# Patient Record
Sex: MALE | Race: WHITE | NOT HISPANIC OR LATINO
[De-identification: names, ages, dates, MRNs, and addresses within clinical notes are randomized per-mention and may not be internally consistent; named-entity substitution may affect disease eponyms.]

---

## 2017-01-09 ENCOUNTER — APPOINTMENT (OUTPATIENT)
Dept: OTOLARYNGOLOGY | Facility: CLINIC | Age: 37
End: 2017-01-09

## 2017-01-22 ENCOUNTER — RESULT REVIEW (OUTPATIENT)
Age: 37
End: 2017-01-22

## 2017-01-23 ENCOUNTER — OUTPATIENT (OUTPATIENT)
Dept: OUTPATIENT SERVICES | Facility: HOSPITAL | Age: 37
LOS: 1 days | End: 2017-01-23
Payer: COMMERCIAL

## 2017-01-23 DIAGNOSIS — C32.9 MALIGNANT NEOPLASM OF LARYNX, UNSPECIFIED: ICD-10-CM

## 2017-01-24 LAB — SURGICAL PATHOLOGY STUDY: SIGNIFICANT CHANGE UP

## 2017-01-25 NOTE — ASU PATIENT PROFILE, ADULT - PSH
Elective surgery  throat surgery biopsy  Elective surgery  appendectomy  Elective surgery  bilateral ear tubes as a child

## 2017-01-26 ENCOUNTER — OUTPATIENT (OUTPATIENT)
Dept: OUTPATIENT SERVICES | Facility: HOSPITAL | Age: 37
LOS: 1 days | Discharge: HOME | End: 2017-01-26
Payer: COMMERCIAL

## 2017-01-26 ENCOUNTER — APPOINTMENT (OUTPATIENT)
Dept: OTOLARYNGOLOGY | Facility: HOSPITAL | Age: 37
End: 2017-01-26

## 2017-01-26 VITALS
OXYGEN SATURATION: 97 % | DIASTOLIC BLOOD PRESSURE: 57 MMHG | SYSTOLIC BLOOD PRESSURE: 123 MMHG | HEIGHT: 65 IN | TEMPERATURE: 99 F | RESPIRATION RATE: 20 BRPM | WEIGHT: 173.72 LBS | HEART RATE: 69 BPM

## 2017-01-26 VITALS
DIASTOLIC BLOOD PRESSURE: 61 MMHG | HEART RATE: 67 BPM | SYSTOLIC BLOOD PRESSURE: 109 MMHG | OXYGEN SATURATION: 98 % | TEMPERATURE: 98 F | RESPIRATION RATE: 16 BRPM

## 2017-01-26 DIAGNOSIS — Z41.9 ENCOUNTER FOR PROCEDURE FOR PURPOSES OTHER THAN REMEDYING HEALTH STATE, UNSPECIFIED: Chronic | ICD-10-CM

## 2017-01-26 PROCEDURE — 31526 DX LARYNGOSCOPY W/OPER SCOPE: CPT

## 2017-01-26 RX ORDER — ACETAMINOPHEN 500 MG
650 TABLET ORAL EVERY 6 HOURS
Qty: 0 | Refills: 0 | Status: DISCONTINUED | OUTPATIENT
Start: 2017-01-26 | End: 2017-01-26

## 2017-01-26 RX ORDER — ONDANSETRON 8 MG/1
4 TABLET, FILM COATED ORAL EVERY 6 HOURS
Qty: 0 | Refills: 0 | Status: DISCONTINUED | OUTPATIENT
Start: 2017-01-26 | End: 2017-01-26

## 2017-01-26 RX ORDER — ACETAMINOPHEN WITH CODEINE 300MG-30MG
1 TABLET ORAL EVERY 4 HOURS
Qty: 0 | Refills: 0 | Status: DISCONTINUED | OUTPATIENT
Start: 2017-01-26 | End: 2017-01-26

## 2017-01-30 ENCOUNTER — APPOINTMENT (OUTPATIENT)
Dept: OTOLARYNGOLOGY | Facility: CLINIC | Age: 37
End: 2017-01-30

## 2017-02-01 ENCOUNTER — APPOINTMENT (OUTPATIENT)
Dept: OTOLARYNGOLOGY | Facility: CLINIC | Age: 37
End: 2017-02-01

## 2017-02-01 VITALS
HEIGHT: 65 IN | TEMPERATURE: 98.4 F | HEART RATE: 99 BPM | SYSTOLIC BLOOD PRESSURE: 117 MMHG | WEIGHT: 170 LBS | DIASTOLIC BLOOD PRESSURE: 89 MMHG | BODY MASS INDEX: 28.32 KG/M2

## 2017-02-01 DIAGNOSIS — R49.0 DYSPHONIA: ICD-10-CM

## 2017-02-01 DIAGNOSIS — K21.9 GASTRO-ESOPHAGEAL REFLUX DISEASE WITHOUT ESOPHAGITIS: ICD-10-CM

## 2017-02-01 DIAGNOSIS — C32.0 MALIGNANT NEOPLASM OF GLOTTIS: ICD-10-CM

## 2017-02-02 ENCOUNTER — TRANSCRIPTION ENCOUNTER (OUTPATIENT)
Age: 37
End: 2017-02-02

## 2017-03-01 ENCOUNTER — APPOINTMENT (OUTPATIENT)
Dept: OTOLARYNGOLOGY | Facility: CLINIC | Age: 37
End: 2017-03-01

## 2017-03-01 VITALS
HEIGHT: 65 IN | WEIGHT: 170 LBS | SYSTOLIC BLOOD PRESSURE: 125 MMHG | DIASTOLIC BLOOD PRESSURE: 76 MMHG | TEMPERATURE: 98.4 F | HEART RATE: 77 BPM | BODY MASS INDEX: 28.32 KG/M2

## 2017-03-07 ENCOUNTER — APPOINTMENT (OUTPATIENT)
Dept: OTOLARYNGOLOGY | Facility: CLINIC | Age: 37
End: 2017-03-07

## 2017-04-05 ENCOUNTER — APPOINTMENT (OUTPATIENT)
Dept: OTOLARYNGOLOGY | Facility: CLINIC | Age: 37
End: 2017-04-05

## 2017-04-05 VITALS — HEIGHT: 65 IN | WEIGHT: 170 LBS | BODY MASS INDEX: 28.32 KG/M2

## 2017-05-03 ENCOUNTER — APPOINTMENT (OUTPATIENT)
Dept: OTOLARYNGOLOGY | Facility: CLINIC | Age: 37
End: 2017-05-03

## 2017-05-15 ENCOUNTER — APPOINTMENT (OUTPATIENT)
Dept: OTOLARYNGOLOGY | Facility: CLINIC | Age: 37
End: 2017-05-15

## 2017-05-15 VITALS
SYSTOLIC BLOOD PRESSURE: 129 MMHG | TEMPERATURE: 98.8 F | HEART RATE: 75 BPM | WEIGHT: 170 LBS | BODY MASS INDEX: 28.32 KG/M2 | HEIGHT: 65 IN | DIASTOLIC BLOOD PRESSURE: 76 MMHG

## 2017-06-14 ENCOUNTER — APPOINTMENT (OUTPATIENT)
Dept: OTOLARYNGOLOGY | Facility: CLINIC | Age: 37
End: 2017-06-14

## 2017-06-14 VITALS
BODY MASS INDEX: 28.49 KG/M2 | SYSTOLIC BLOOD PRESSURE: 125 MMHG | WEIGHT: 171 LBS | DIASTOLIC BLOOD PRESSURE: 69 MMHG | HEIGHT: 65 IN | TEMPERATURE: 98.7 F | HEART RATE: 62 BPM

## 2017-07-26 ENCOUNTER — APPOINTMENT (OUTPATIENT)
Dept: OTOLARYNGOLOGY | Facility: CLINIC | Age: 37
End: 2017-07-26

## 2017-07-26 VITALS
TEMPERATURE: 98.3 F | WEIGHT: 177 LBS | BODY MASS INDEX: 29.49 KG/M2 | OXYGEN SATURATION: 98 % | SYSTOLIC BLOOD PRESSURE: 122 MMHG | HEART RATE: 72 BPM | DIASTOLIC BLOOD PRESSURE: 79 MMHG | HEIGHT: 65 IN

## 2017-08-23 ENCOUNTER — APPOINTMENT (OUTPATIENT)
Dept: OTOLARYNGOLOGY | Facility: CLINIC | Age: 37
End: 2017-08-23

## 2017-08-28 ENCOUNTER — APPOINTMENT (OUTPATIENT)
Dept: OTOLARYNGOLOGY | Facility: CLINIC | Age: 37
End: 2017-08-28
Payer: COMMERCIAL

## 2017-08-28 PROCEDURE — 99214 OFFICE O/P EST MOD 30 MIN: CPT | Mod: 25

## 2017-08-28 PROCEDURE — 31575 DIAGNOSTIC LARYNGOSCOPY: CPT

## 2017-09-20 ENCOUNTER — APPOINTMENT (OUTPATIENT)
Dept: OTOLARYNGOLOGY | Facility: CLINIC | Age: 37
End: 2017-09-20
Payer: COMMERCIAL

## 2017-09-20 VITALS
HEIGHT: 65 IN | DIASTOLIC BLOOD PRESSURE: 86 MMHG | SYSTOLIC BLOOD PRESSURE: 126 MMHG | WEIGHT: 177 LBS | HEART RATE: 76 BPM | BODY MASS INDEX: 29.49 KG/M2 | TEMPERATURE: 98.9 F

## 2017-09-20 DIAGNOSIS — Q31.0 WEB OF LARYNX: ICD-10-CM

## 2017-09-20 PROCEDURE — 99213 OFFICE O/P EST LOW 20 MIN: CPT | Mod: 25

## 2017-09-20 PROCEDURE — 31575 DIAGNOSTIC LARYNGOSCOPY: CPT

## 2017-10-18 ENCOUNTER — APPOINTMENT (OUTPATIENT)
Dept: OTOLARYNGOLOGY | Facility: CLINIC | Age: 37
End: 2017-10-18
Payer: COMMERCIAL

## 2017-10-18 VITALS
WEIGHT: 177 LBS | BODY MASS INDEX: 29.49 KG/M2 | SYSTOLIC BLOOD PRESSURE: 125 MMHG | HEIGHT: 65 IN | TEMPERATURE: 98.7 F | DIASTOLIC BLOOD PRESSURE: 78 MMHG | HEART RATE: 82 BPM

## 2017-10-18 PROCEDURE — 99213 OFFICE O/P EST LOW 20 MIN: CPT | Mod: 25

## 2017-10-18 PROCEDURE — 31575 DIAGNOSTIC LARYNGOSCOPY: CPT

## 2017-11-29 ENCOUNTER — APPOINTMENT (OUTPATIENT)
Dept: OTOLARYNGOLOGY | Facility: CLINIC | Age: 37
End: 2017-11-29
Payer: COMMERCIAL

## 2017-11-29 VITALS
SYSTOLIC BLOOD PRESSURE: 126 MMHG | HEIGHT: 65 IN | HEART RATE: 83 BPM | WEIGHT: 177 LBS | BODY MASS INDEX: 29.49 KG/M2 | TEMPERATURE: 98.2 F | DIASTOLIC BLOOD PRESSURE: 81 MMHG

## 2017-11-29 PROCEDURE — 31575 DIAGNOSTIC LARYNGOSCOPY: CPT

## 2017-11-29 PROCEDURE — 99214 OFFICE O/P EST MOD 30 MIN: CPT | Mod: 25

## 2017-12-27 ENCOUNTER — APPOINTMENT (OUTPATIENT)
Dept: OTOLARYNGOLOGY | Facility: CLINIC | Age: 37
End: 2017-12-27
Payer: COMMERCIAL

## 2017-12-27 VITALS
TEMPERATURE: 97.6 F | SYSTOLIC BLOOD PRESSURE: 128 MMHG | HEIGHT: 65 IN | DIASTOLIC BLOOD PRESSURE: 87 MMHG | WEIGHT: 177 LBS | BODY MASS INDEX: 29.49 KG/M2 | HEART RATE: 86 BPM

## 2017-12-27 PROCEDURE — 31575 DIAGNOSTIC LARYNGOSCOPY: CPT

## 2017-12-27 PROCEDURE — 99213 OFFICE O/P EST LOW 20 MIN: CPT | Mod: 25

## 2018-02-21 ENCOUNTER — APPOINTMENT (OUTPATIENT)
Dept: OTOLARYNGOLOGY | Facility: CLINIC | Age: 38
End: 2018-02-21
Payer: COMMERCIAL

## 2018-02-21 VITALS
DIASTOLIC BLOOD PRESSURE: 85 MMHG | HEART RATE: 78 BPM | TEMPERATURE: 98.6 F | SYSTOLIC BLOOD PRESSURE: 129 MMHG | OXYGEN SATURATION: 98 % | BODY MASS INDEX: 29.49 KG/M2 | WEIGHT: 177 LBS | HEIGHT: 65 IN

## 2018-02-21 DIAGNOSIS — Z87.898 PERSONAL HISTORY OF OTHER SPECIFIED CONDITIONS: ICD-10-CM

## 2018-02-21 DIAGNOSIS — K21.9 GASTRO-ESOPHAGEAL REFLUX DISEASE W/OUT ESOPHAGITIS: ICD-10-CM

## 2018-02-21 DIAGNOSIS — Z82.3 FAMILY HISTORY OF STROKE: ICD-10-CM

## 2018-02-21 DIAGNOSIS — Z87.39 PERSONAL HISTORY OF OTHER DISEASES OF THE MUSCULOSKELETAL SYSTEM AND CONNECTIVE TISSUE: ICD-10-CM

## 2018-02-21 DIAGNOSIS — Z80.9 FAMILY HISTORY OF MALIGNANT NEOPLASM, UNSPECIFIED: ICD-10-CM

## 2018-02-21 DIAGNOSIS — Z87.19 PERSONAL HISTORY OF OTHER DISEASES OF THE DIGESTIVE SYSTEM: ICD-10-CM

## 2018-02-21 DIAGNOSIS — Z83.3 FAMILY HISTORY OF DIABETES MELLITUS: ICD-10-CM

## 2018-02-21 PROBLEM — F32.9 MAJOR DEPRESSIVE DISORDER, SINGLE EPISODE, UNSPECIFIED: Chronic | Status: ACTIVE | Noted: 2017-01-25

## 2018-02-21 PROCEDURE — 31575 DIAGNOSTIC LARYNGOSCOPY: CPT

## 2018-02-21 PROCEDURE — 99213 OFFICE O/P EST LOW 20 MIN: CPT | Mod: 25

## 2018-04-25 ENCOUNTER — APPOINTMENT (OUTPATIENT)
Dept: OTOLARYNGOLOGY | Facility: CLINIC | Age: 38
End: 2018-04-25
Payer: COMMERCIAL

## 2018-04-25 VITALS
HEIGHT: 65 IN | SYSTOLIC BLOOD PRESSURE: 116 MMHG | WEIGHT: 177 LBS | OXYGEN SATURATION: 98 % | DIASTOLIC BLOOD PRESSURE: 78 MMHG | HEART RATE: 83 BPM | BODY MASS INDEX: 29.49 KG/M2

## 2018-04-25 DIAGNOSIS — Z87.19 PERSONAL HISTORY OF OTHER DISEASES OF THE DIGESTIVE SYSTEM: ICD-10-CM

## 2018-04-25 DIAGNOSIS — F17.200 NICOTINE DEPENDENCE, UNSPECIFIED, UNCOMPLICATED: ICD-10-CM

## 2018-04-25 PROCEDURE — 31575 DIAGNOSTIC LARYNGOSCOPY: CPT

## 2018-04-25 PROCEDURE — 99213 OFFICE O/P EST LOW 20 MIN: CPT | Mod: 25

## 2018-06-27 ENCOUNTER — APPOINTMENT (OUTPATIENT)
Dept: OTOLARYNGOLOGY | Facility: CLINIC | Age: 38
End: 2018-06-27
Payer: COMMERCIAL

## 2018-06-27 VITALS
BODY MASS INDEX: 29.99 KG/M2 | SYSTOLIC BLOOD PRESSURE: 120 MMHG | HEIGHT: 65 IN | WEIGHT: 180 LBS | OXYGEN SATURATION: 99 % | TEMPERATURE: 98.8 F | DIASTOLIC BLOOD PRESSURE: 75 MMHG | HEART RATE: 69 BPM

## 2018-06-27 PROCEDURE — 99213 OFFICE O/P EST LOW 20 MIN: CPT | Mod: 25

## 2018-06-27 PROCEDURE — 31575 DIAGNOSTIC LARYNGOSCOPY: CPT

## 2018-09-05 ENCOUNTER — APPOINTMENT (OUTPATIENT)
Dept: OTOLARYNGOLOGY | Facility: CLINIC | Age: 38
End: 2018-09-05
Payer: COMMERCIAL

## 2018-09-05 VITALS
HEIGHT: 65 IN | WEIGHT: 170 LBS | SYSTOLIC BLOOD PRESSURE: 124 MMHG | OXYGEN SATURATION: 98 % | BODY MASS INDEX: 28.32 KG/M2 | TEMPERATURE: 98.6 F | HEART RATE: 75 BPM | DIASTOLIC BLOOD PRESSURE: 83 MMHG

## 2018-09-05 PROCEDURE — 99213 OFFICE O/P EST LOW 20 MIN: CPT | Mod: 25

## 2018-09-05 PROCEDURE — 31575 DIAGNOSTIC LARYNGOSCOPY: CPT

## 2018-11-14 ENCOUNTER — APPOINTMENT (OUTPATIENT)
Dept: OTOLARYNGOLOGY | Facility: CLINIC | Age: 38
End: 2018-11-14
Payer: COMMERCIAL

## 2018-11-14 VITALS
OXYGEN SATURATION: 99 % | DIASTOLIC BLOOD PRESSURE: 60 MMHG | SYSTOLIC BLOOD PRESSURE: 125 MMHG | HEART RATE: 94 BPM | BODY MASS INDEX: 29.16 KG/M2 | HEIGHT: 65 IN | WEIGHT: 175 LBS

## 2018-11-14 DIAGNOSIS — H18.602 KERATOCONUS, UNSPECIFIED, LEFT EYE: ICD-10-CM

## 2018-11-14 PROCEDURE — 99213 OFFICE O/P EST LOW 20 MIN: CPT | Mod: 25

## 2018-11-14 PROCEDURE — 31575 DIAGNOSTIC LARYNGOSCOPY: CPT

## 2018-12-09 ENCOUNTER — FORM ENCOUNTER (OUTPATIENT)
Age: 38
End: 2018-12-09

## 2018-12-10 ENCOUNTER — OUTPATIENT (OUTPATIENT)
Dept: OUTPATIENT SERVICES | Facility: HOSPITAL | Age: 38
LOS: 1 days | End: 2018-12-10
Payer: COMMERCIAL

## 2018-12-10 ENCOUNTER — APPOINTMENT (OUTPATIENT)
Dept: ORTHOPEDIC SURGERY | Facility: CLINIC | Age: 38
End: 2018-12-10
Payer: COMMERCIAL

## 2018-12-10 VITALS
OXYGEN SATURATION: 98 % | DIASTOLIC BLOOD PRESSURE: 76 MMHG | SYSTOLIC BLOOD PRESSURE: 120 MMHG | HEIGHT: 65 IN | HEART RATE: 80 BPM | BODY MASS INDEX: 29.16 KG/M2 | WEIGHT: 175 LBS

## 2018-12-10 DIAGNOSIS — Z41.9 ENCOUNTER FOR PROCEDURE FOR PURPOSES OTHER THAN REMEDYING HEALTH STATE, UNSPECIFIED: Chronic | ICD-10-CM

## 2018-12-10 PROCEDURE — 73564 X-RAY EXAM KNEE 4 OR MORE: CPT

## 2018-12-10 PROCEDURE — 99203 OFFICE O/P NEW LOW 30 MIN: CPT

## 2018-12-10 PROCEDURE — 73564 X-RAY EXAM KNEE 4 OR MORE: CPT | Mod: 26,50

## 2018-12-13 ENCOUNTER — APPOINTMENT (OUTPATIENT)
Dept: ORTHOPEDIC SURGERY | Facility: CLINIC | Age: 38
End: 2018-12-13

## 2019-01-16 ENCOUNTER — APPOINTMENT (OUTPATIENT)
Dept: OTOLARYNGOLOGY | Facility: CLINIC | Age: 39
End: 2019-01-16
Payer: COMMERCIAL

## 2019-01-16 VITALS
WEIGHT: 170 LBS | DIASTOLIC BLOOD PRESSURE: 83 MMHG | BODY MASS INDEX: 28.32 KG/M2 | OXYGEN SATURATION: 99 % | HEART RATE: 96 BPM | SYSTOLIC BLOOD PRESSURE: 141 MMHG | HEIGHT: 65 IN

## 2019-01-16 PROCEDURE — 31575 DIAGNOSTIC LARYNGOSCOPY: CPT

## 2019-01-16 PROCEDURE — 99213 OFFICE O/P EST LOW 20 MIN: CPT | Mod: 25

## 2019-01-16 RX ORDER — DULOXETINE HYDROCHLORIDE 60 MG/1
60 CAPSULE, DELAYED RELEASE ORAL
Refills: 0 | Status: ACTIVE | COMMUNITY

## 2019-01-16 NOTE — PHYSICAL EXAM
[Laryngoscopy Performed] : laryngoscopy was performed, see procedure section for findings [Normal] : orientation to person, place, and time: normal

## 2019-01-17 NOTE — ASSESSMENT
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 \par - RTC should any worrisome symptoms arise.\par - Pt verbalized understanding of above. \par

## 2019-01-17 NOTE — HISTORY OF PRESENT ILLNESS
[No] : patient does not have a  history of radiation therapy [de-identified] : 37 yo M with history of T1N0M0 glottic carcinoma s/p microexcision, following closely to r/o persistence/recurrence. Patient feeling well. Has chronic hoarseness that has not changed. Denies dysphagia, dyspnea, neck masses/lesions, fever, chills, weight changes.

## 2019-01-17 NOTE — HISTORY OF PRESENT ILLNESS
[No] : patient does not have a  history of radiation therapy [de-identified] : 37 yo M with history of T1N0M0 glottic carcinoma s/p microexcision, following closely to r/o persistence/recurrence. Patient feeling well. Has chronic hoarseness that has not changed. Denies dysphagia, dyspnea, neck masses/lesions, fever, chills, weight changes.

## 2019-01-17 NOTE — PROCEDURE
[Image(s) Captured] : image(s) captured and filed [Unable to Cooperate with Mirror] : patient unable to cooperate with mirror [Hoarseness] : hoarseness not clearly evaluated by indirect laryngoscopy [None] : none [Flexible Endoscope] : examined with the flexible endoscope [de-identified] : Flexible fiberoptic laryngoscope advanced orally, no oropharyngeal lesions or masses identified, larynx visualized, adequate and symmetric cord adduction and abduction noted, left vocal cord thinner than the right, no masses, lesions, erythema, edema or fullness noted.\par  [de-identified] : surveillance of glottic ca

## 2019-01-17 NOTE — PROCEDURE
[Image(s) Captured] : image(s) captured and filed [Unable to Cooperate with Mirror] : patient unable to cooperate with mirror [Hoarseness] : hoarseness not clearly evaluated by indirect laryngoscopy [None] : none [Flexible Endoscope] : examined with the flexible endoscope [de-identified] : Flexible fiberoptic laryngoscope advanced orally, no oropharyngeal lesions or masses identified, larynx visualized, adequate and symmetric cord adduction and abduction noted, left vocal cord thinner than the right, no masses, lesions, erythema, edema or fullness noted.\par  [de-identified] : surveillance of glottic ca

## 2019-01-17 NOTE — REASON FOR VISIT
[Subsequent Evaluation] : a subsequent evaluation for [FreeTextEntry2] : surveillance of T1N0M0 glottic ca

## 2019-01-22 NOTE — DISCUSSION/SUMMARY
[Cancer Type / Location / Histology Subtype: ________] : Cancer Type / Location / Histology Subtype: [unfilled] [Diagnosis Date (year): ____] : Diagnosis Date (year): [unfilled] [I] : I [Surgery] : Surgery: Yes [Surgery Date(s) (year): ____] : Surgery Date(s) (year): [unfilled] [Persistent symptoms or side effects at completion of treatment?  If Yes, list types ___] : Persistent symptoms or side effects at completion of treatment? Yes, [unfilled] [Follow up with Surgeon in _____] : Follow up with Surgeon in [unfilled] [Primary care physician] : primary care physician [Emotional and mental health] : Emotional and mental health [Physical Functioning] : Physical functioning [Path to Wellness Survivorship Program] : [facility] [Surgical Procedure / Location / Findings: _________] : Surgical Procedure / Location / Findings: [unfilled] [Radiation] : Radiation: No [Systemic Therapy (chemotherapy, hormonal therapy, other)] : Systemic Therapy (chemotherapy, hormonal therapy, other): No [Need for onging (adjuvant) treatment for cancer?] : Need for onging (adjuvant) treatment for cancer? No [FreeTextEntry8] : Joey Barba [FreeTextEntry9] : 1/18/2019

## 2019-02-20 ENCOUNTER — APPOINTMENT (OUTPATIENT)
Dept: OTOLARYNGOLOGY | Facility: CLINIC | Age: 39
End: 2019-02-20
Payer: COMMERCIAL

## 2019-02-20 VITALS
SYSTOLIC BLOOD PRESSURE: 134 MMHG | WEIGHT: 170 LBS | HEART RATE: 88 BPM | HEIGHT: 65 IN | DIASTOLIC BLOOD PRESSURE: 73 MMHG | BODY MASS INDEX: 28.32 KG/M2 | OXYGEN SATURATION: 98 % | TEMPERATURE: 98.5 F

## 2019-02-20 DIAGNOSIS — F45.8 OTHER SOMATOFORM DISORDERS: ICD-10-CM

## 2019-02-20 PROCEDURE — 31575 DIAGNOSTIC LARYNGOSCOPY: CPT

## 2019-02-20 PROCEDURE — 99213 OFFICE O/P EST LOW 20 MIN: CPT | Mod: 25

## 2019-02-21 PROBLEM — F45.8 GLOBUS SENSATION: Status: ACTIVE | Noted: 2019-02-21

## 2019-02-21 NOTE — HISTORY OF PRESENT ILLNESS
[No] : patient does not have a  history of radiation therapy [de-identified] : 37 yo M with history of T1N0M0 glottic carcinoma s/p microexcision, following closely to r/o persistence/recurrence. Pt reports his kids had the flu 2 weeks ago.  Since then, he has a globus sensation whenever he swallows.  Feels like a popcorn kernel is stuck in there.  His friends have told him that his voice has changed and is more scratchy.  He reports a stable nasal congestion and chronic cough with yellow/white phlegm for which he takes omeprazole and Flonase.  Denies dysphagia, dyspnea, neck masses/lesions, fever, chills, weight changes. \par \par Pt is scheduled for corrective procedure for keratoconus on March 6.\par

## 2019-02-21 NOTE — REASON FOR VISIT
[Subsequent Evaluation] : a subsequent evaluation for [FreeTextEntry2] : T1N0M0 glottic ca, globus sensation and hoarseness

## 2019-02-21 NOTE — ASSESSMENT
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  Pt reports globus and change of voice x 2 weeks, LEANN on exam.\par \par - No evidence of disease/recurrence.\par - f/u 1-2 mos, sooner if symptoms persist. \par - Pt verbalized understanding of above.

## 2019-02-21 NOTE — PROCEDURE
[Image(s) Captured] : image(s) captured and filed [Unable to Cooperate with Mirror] : patient unable to cooperate with mirror [Hoarseness] : hoarseness not clearly evaluated by indirect laryngoscopy [Globus] : globus [None] : none [Flexible Endoscope] : examined with the flexible endoscope [de-identified] : Flexible fiberoptic laryngoscope advanced orally, no oropharyngeal lesions or masses identified, larynx visualized, adequate and symmetric cord adduction and abduction noted, no masses, lesions, erythema, leukoplakia, erythroplakia, edema or fullness noted.  No foreign objects visualized.  No pooling of secretions. [de-identified] : surveillance of glottic ca

## 2019-04-09 ENCOUNTER — RX RENEWAL (OUTPATIENT)
Age: 39
End: 2019-04-09

## 2019-04-17 ENCOUNTER — APPOINTMENT (OUTPATIENT)
Dept: OTOLARYNGOLOGY | Facility: CLINIC | Age: 39
End: 2019-04-17

## 2019-05-29 ENCOUNTER — APPOINTMENT (OUTPATIENT)
Dept: OTOLARYNGOLOGY | Facility: CLINIC | Age: 39
End: 2019-05-29
Payer: COMMERCIAL

## 2019-05-29 VITALS
HEART RATE: 86 BPM | HEIGHT: 65 IN | SYSTOLIC BLOOD PRESSURE: 135 MMHG | DIASTOLIC BLOOD PRESSURE: 75 MMHG | OXYGEN SATURATION: 98 % | WEIGHT: 170 LBS | BODY MASS INDEX: 28.32 KG/M2

## 2019-05-29 DIAGNOSIS — R49.0 DYSPHONIA: ICD-10-CM

## 2019-05-29 PROCEDURE — 31575 DIAGNOSTIC LARYNGOSCOPY: CPT

## 2019-05-29 PROCEDURE — 99213 OFFICE O/P EST LOW 20 MIN: CPT | Mod: 25

## 2019-05-31 PROBLEM — R49.0 HOARSENESS: Status: ACTIVE | Noted: 2019-02-21

## 2019-05-31 NOTE — REASON FOR VISIT
[Subsequent Evaluation] : a subsequent evaluation for [FreeTextEntry2] : T1N0M0 glottic ca, hoarseness

## 2019-05-31 NOTE — HISTORY OF PRESENT ILLNESS
[No] : patient does not have a  history of radiation therapy [de-identified] : 39 yo M with history of T1N0M0 glottic carcinoma s/p microexcision, following closely to r/o persistence/recurrence. He reports persistent mild hoarseness.  Denies dysphagia, dyspnea, neck masses/lesions, fever, chills, weight changes. \par \par

## 2019-05-31 NOTE — PROCEDURE
[Image(s) Captured] : image(s) captured and filed [Unable to Cooperate with Mirror] : patient unable to cooperate with mirror [Hoarseness] : hoarseness not clearly evaluated by indirect laryngoscopy [None] : none [Flexible Endoscope] : examined with the flexible endoscope [de-identified] : Flexible fiberoptic laryngoscope advanced orally, no oropharyngeal lesions or masses identified, larynx visualized, adequate and symmetric cord adduction and abduction noted, no masses, lesions, erythema, leukoplakia, erythroplakia, edema or fullness noted.

## 2019-05-31 NOTE — ASSESSMENT
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 Pt reports persistent mild hoarseness, LEANN on exam.\par \par - No evidence of disease/recurrence.\par - f/u 2 weeks.\par - Pt verbalized understanding of above.

## 2019-06-16 ENCOUNTER — TRANSCRIPTION ENCOUNTER (OUTPATIENT)
Age: 39
End: 2019-06-16

## 2019-06-17 ENCOUNTER — APPOINTMENT (OUTPATIENT)
Dept: OTOLARYNGOLOGY | Facility: CLINIC | Age: 39
End: 2019-06-17
Payer: COMMERCIAL

## 2019-06-17 VITALS
WEIGHT: 150 LBS | SYSTOLIC BLOOD PRESSURE: 111 MMHG | DIASTOLIC BLOOD PRESSURE: 72 MMHG | TEMPERATURE: 80 F | BODY MASS INDEX: 24.99 KG/M2 | OXYGEN SATURATION: 98 % | HEIGHT: 65 IN

## 2019-06-17 PROCEDURE — 99213 OFFICE O/P EST LOW 20 MIN: CPT | Mod: 25

## 2019-06-17 PROCEDURE — 31575 DIAGNOSTIC LARYNGOSCOPY: CPT

## 2019-06-18 NOTE — REASON FOR VISIT
[Subsequent Evaluation] : a subsequent evaluation for [FreeTextEntry2] : checkup on voice/vocal cords and hypercalcemia

## 2019-06-18 NOTE — ASSESSMENT
[FreeTextEntry1] : 37 yo M with hx of glottic cancer with no clinical evidence of disease, vocal cords show no sign of cancer or pre-cancer and there is minimal if any scarring.  He does however have hyperalcemia and elevate PTH concerning for parathyroid adenoma\par -will obtain sestimibi (NM scan) to see if there is localizing adenoma\par -possible parathyroidectomy pending scan results, will call him when study compelted and reviewed

## 2019-06-18 NOTE — PHYSICAL EXAM
[Midline] : trachea located in midline position [Laryngoscopy Performed] : laryngoscopy was performed, see procedure section for findings [Normal] : no rashes [de-identified] : no masses, no LAD

## 2019-06-18 NOTE — HISTORY OF PRESENT ILLNESS
[de-identified] : 39 yo M with history of T1N0M0 glottic carcinoma s/p microexcision by outside provider with no clinical evidece of disease, followed closely now to r/o recurrence. Last visit he reported hoarseness although this was in the setting of sick contacts, family members with URI. Throughout, he denies dysphagia, dyspnea, neck masses/lesions, fever, chills, weight changes. [FreeTextEntry1] :  Robel reports that his voice has returned to baseline, he is feeling well, he is concerned though about his hypercalcemia and asks about the NM scan and tests.  He denies abdominal pain, kidney stones, bone pain but does report some mental fatigue/slugghishness

## 2019-06-18 NOTE — PROCEDURE
[Image(s) Captured] : image(s) captured and filed [Lesion] : lesion identified by mirror examination needing further evaluation [None] : none [Flexible Endoscope] : examined with the flexible endoscope [de-identified] : flexible fiberoptic laryngoscope advanced orally, no oropharyngeal lesions identified, larynx visualized no ulcerating or fungating masses, no leukoplakic or erythroplakic lesions or nodules of the true vocal cords which abduct and adduct and meet in the midline no lesions, larynx is pristine [de-identified] : post-op surveillance for glottic cancer

## 2019-06-18 NOTE — REVIEW OF SYSTEMS
[As Noted in HPI] : as noted in HPI [Negative] : Psychiatric [Swelling Neck] : no neck swelling [Swelling Face] : no face swelling [Chest Pain] : no chest pain [Palpitations] : no palpitations [Cough] : no cough [Fainting] : no fainting [Convulsions] : no convulsions [Dizziness] : no dizziness [Swollen Glands In The Neck] : no swollen glands in the neck

## 2019-06-25 ENCOUNTER — FORM ENCOUNTER (OUTPATIENT)
Age: 39
End: 2019-06-25

## 2019-06-26 ENCOUNTER — OUTPATIENT (OUTPATIENT)
Dept: OUTPATIENT SERVICES | Facility: HOSPITAL | Age: 39
LOS: 1 days | End: 2019-06-26
Payer: COMMERCIAL

## 2019-06-26 DIAGNOSIS — Z41.9 ENCOUNTER FOR PROCEDURE FOR PURPOSES OTHER THAN REMEDYING HEALTH STATE, UNSPECIFIED: Chronic | ICD-10-CM

## 2019-06-26 PROCEDURE — A9512: CPT

## 2019-06-26 PROCEDURE — 78072 PARATHYRD PLANAR W/SPECT&CT: CPT

## 2019-06-26 PROCEDURE — 78072 PARATHYRD PLANAR W/SPECT&CT: CPT | Mod: 26

## 2019-06-26 PROCEDURE — A9500: CPT

## 2019-07-02 ENCOUNTER — FORM ENCOUNTER (OUTPATIENT)
Age: 39
End: 2019-07-02

## 2019-07-03 ENCOUNTER — APPOINTMENT (OUTPATIENT)
Dept: CT IMAGING | Facility: HOSPITAL | Age: 39
End: 2019-07-03
Payer: COMMERCIAL

## 2019-07-03 ENCOUNTER — OUTPATIENT (OUTPATIENT)
Dept: OUTPATIENT SERVICES | Facility: HOSPITAL | Age: 39
LOS: 1 days | End: 2019-07-03
Payer: COMMERCIAL

## 2019-07-03 DIAGNOSIS — Z41.9 ENCOUNTER FOR PROCEDURE FOR PURPOSES OTHER THAN REMEDYING HEALTH STATE, UNSPECIFIED: Chronic | ICD-10-CM

## 2019-07-03 PROCEDURE — 70491 CT SOFT TISSUE NECK W/DYE: CPT | Mod: 26

## 2019-07-03 PROCEDURE — 70492 CT SFT TSUE NCK W/O & W/DYE: CPT

## 2019-07-08 ENCOUNTER — TRANSCRIPTION ENCOUNTER (OUTPATIENT)
Age: 39
End: 2019-07-08

## 2019-07-08 ENCOUNTER — RX RENEWAL (OUTPATIENT)
Age: 39
End: 2019-07-08

## 2019-08-12 ENCOUNTER — RESULT REVIEW (OUTPATIENT)
Age: 39
End: 2019-08-12

## 2019-08-12 LAB
25(OH)D3 SERPL-MCNC: 24.8 NG/ML
CALCIUM ?TM UR-MCNC: 19.3 MG/DL
CALCIUM SERPL-MCNC: 11 MG/DL
CALCIUM/CREAT UR: 0.2 RATIO
CAU: 8 MG/DL
CREAT 24H UR-MCNC: 1.8 G/24 H
CREAT 24H UR-MCNC: 1.8 G/24 H
CREAT ?TM UR-MCNC: 71 MG/DL
CREAT ?TM UR-MCNC: 71 MG/DL
CREAT SERPL-MCNC: 0.77 MG/DL
CREAT SPEC-SCNC: 106 MG/DL
PROT ?TM UR-MCNC: 24 HR
PROT ?TM UR-MCNC: 24 HR
SPECIMEN VOL 24H UR: 198 MG/24 H
SPECIMEN VOL 24H UR: 2475 ML
SPECIMEN VOL 24H UR: 2475 ML

## 2019-08-26 ENCOUNTER — APPOINTMENT (OUTPATIENT)
Dept: ENDOCRINOLOGY | Facility: CLINIC | Age: 39
End: 2019-08-26
Payer: COMMERCIAL

## 2019-08-26 VITALS
HEART RATE: 92 BPM | BODY MASS INDEX: 26.33 KG/M2 | DIASTOLIC BLOOD PRESSURE: 76 MMHG | WEIGHT: 158 LBS | SYSTOLIC BLOOD PRESSURE: 129 MMHG | HEIGHT: 65 IN

## 2019-08-26 DIAGNOSIS — Z00.00 ENCOUNTER FOR GENERAL ADULT MEDICAL EXAMINATION W/OUT ABNORMAL FINDINGS: ICD-10-CM

## 2019-08-26 DIAGNOSIS — F41.9 ANXIETY DISORDER, UNSPECIFIED: ICD-10-CM

## 2019-08-26 DIAGNOSIS — E83.52 HYPERCALCEMIA: ICD-10-CM

## 2019-08-26 PROCEDURE — 99205 OFFICE O/P NEW HI 60 MIN: CPT

## 2019-08-26 NOTE — ASSESSMENT
[FreeTextEntry1] : Possible primary hyperparathyroidism.\par Lab. tests today.\par Discussed options - observation vs surgery.\par Would favor surgery because of the patient's young age.\par Will obtain bone density.\par F/u once the above results are available.\par \par

## 2019-08-26 NOTE — HISTORY OF PRESENT ILLNESS
[FreeTextEntry1] : 38 y.o. male who was noticed to have hypercalcemia in 10.7 - 11.0 mg/dl range about 2 months ago.\par 24 hr urine calcium excretion was mildly elevated.\par I have not found any PTH levels.\par Nuclear parathyroid  imaging and 4D CT scan of the heck were negative.\par The patient does not appear to have any symptoms that could be due to hypercalcemia.\par His only complaint is anxiety.

## 2019-08-26 NOTE — PHYSICAL EXAM
[No Acute Distress] : no acute distress [Alert] : alert [Well Developed] : well developed [Well Nourished] : well nourished [Normal Sclera/Conjunctiva] : normal sclera/conjunctiva [EOMI] : extra ocular movement intact [No Proptosis] : no proptosis [Normal Oropharynx] : the oropharynx was normal [No Thyroid Nodules] : there were no palpable thyroid nodules [Thyroid Not Enlarged] : the thyroid was not enlarged [No Respiratory Distress] : no respiratory distress [No Accessory Muscle Use] : no accessory muscle use [Clear to Auscultation] : lungs were clear to auscultation bilaterally [Normal Rate] : heart rate was normal  [Regular Rhythm] : with a regular rhythm [Normal S1, S2] : normal S1 and S2 [Pedal Pulses Normal] : the pedal pulses are present [No Edema] : there was no peripheral edema [Normal Bowel Sounds] : normal bowel sounds [Not Tender] : non-tender [Not Distended] : not distended [Soft] : abdomen soft [Post Cervical Nodes] : posterior cervical nodes [Anterior Cervical Nodes] : anterior cervical nodes [Axillary Nodes] : axillary nodes [Normal] : normal and non tender [No Spinal Tenderness] : no spinal tenderness [No Stigmata of Cushings Syndrome] : no stigmata of cushings syndrome [Spine Straight] : spine straight [Normal Gait] : normal gait [Normal Strength/Tone] : muscle strength and tone were normal [No Rash] : no rash [No Tremors] : no tremors [Acanthosis Nigricans] : no acanthosis nigricans [Normal Reflexes] : deep tendon reflexes were 2+ and symmetric [Oriented x3] : oriented to person, place, and time

## 2019-09-24 ENCOUNTER — MOBILE ON CALL (OUTPATIENT)
Age: 39
End: 2019-09-24

## 2019-10-11 ENCOUNTER — RX RENEWAL (OUTPATIENT)
Age: 39
End: 2019-10-11

## 2019-10-23 VITALS
HEART RATE: 97 BPM | DIASTOLIC BLOOD PRESSURE: 81 MMHG | WEIGHT: 165.35 LBS | TEMPERATURE: 98 F | RESPIRATION RATE: 16 BRPM | HEIGHT: 65 IN | OXYGEN SATURATION: 98 % | SYSTOLIC BLOOD PRESSURE: 121 MMHG

## 2019-10-23 NOTE — ASU PATIENT PROFILE, ADULT - PMH
Larynx cancer ADHD    Anxiety    Complex regional pain syndrome i of left lower limb    GERD (gastroesophageal reflux disease)    Larynx cancer

## 2019-10-23 NOTE — ASU PREOP CHECKLIST - ASSESSMENT, HISTORY & PHYSICAL COMPLETED AND ON MEDICAL RECORD
Katie Saravia is a 30 y.o. female    Chief Complaint   Patient presents with    Suture Removal       1. Have you been to the ER, urgent care clinic since your last visit? Hospitalized since your last visit? No    2. Have you seen or consulted any other health care providers outside of the [facility] since your last visit? Include any pap smears or colon screening.  No done

## 2019-10-23 NOTE — ASU PATIENT PROFILE, ADULT - PSH
History of eye surgery  Cornea transplant- left eye  Other elective surgery  Left Leg surgery - developed CRPS - complex renal pain syndrome

## 2019-10-24 ENCOUNTER — OUTPATIENT (OUTPATIENT)
Dept: OUTPATIENT SERVICES | Facility: HOSPITAL | Age: 39
LOS: 1 days | Discharge: HOME | End: 2019-10-24
Payer: COMMERCIAL

## 2019-10-24 ENCOUNTER — APPOINTMENT (OUTPATIENT)
Dept: OTOLARYNGOLOGY | Facility: HOSPITAL | Age: 39
End: 2019-10-24

## 2019-10-24 ENCOUNTER — RESULT REVIEW (OUTPATIENT)
Age: 39
End: 2019-10-24

## 2019-10-24 VITALS
HEART RATE: 83 BPM | OXYGEN SATURATION: 98 % | RESPIRATION RATE: 16 BRPM | DIASTOLIC BLOOD PRESSURE: 66 MMHG | SYSTOLIC BLOOD PRESSURE: 118 MMHG

## 2019-10-24 DIAGNOSIS — Z41.9 ENCOUNTER FOR PROCEDURE FOR PURPOSES OTHER THAN REMEDYING HEALTH STATE, UNSPECIFIED: Chronic | ICD-10-CM

## 2019-10-24 DIAGNOSIS — Z98.890 OTHER SPECIFIED POSTPROCEDURAL STATES: Chronic | ICD-10-CM

## 2019-10-24 LAB
PTH-INTACT IO % DIF SERPL: 103.1 PG/ML — HIGH (ref 8.5–72.5)
PTH-INTACT IO % DIF SERPL: 13.1 PG/ML — SIGNIFICANT CHANGE UP (ref 8.5–72.5)
PTH-INTACT IO % DIF SERPL: 17.5 PG/ML — SIGNIFICANT CHANGE UP (ref 8.5–72.5)

## 2019-10-24 PROCEDURE — 36415 COLL VENOUS BLD VENIPUNCTURE: CPT

## 2019-10-24 PROCEDURE — 31525 DX LARYNGOSCOPY EXCL NB: CPT

## 2019-10-24 PROCEDURE — 88331 PATH CONSLTJ SURG 1 BLK 1SPC: CPT

## 2019-10-24 PROCEDURE — 88305 TISSUE EXAM BY PATHOLOGIST: CPT

## 2019-10-24 PROCEDURE — 83970 ASSAY OF PARATHORMONE: CPT

## 2019-10-24 PROCEDURE — 60500 EXPLORE PARATHYROID GLANDS: CPT

## 2019-10-24 RX ORDER — OXYCODONE AND ACETAMINOPHEN 5; 325 MG/1; MG/1
1 TABLET ORAL EVERY 4 HOURS
Refills: 0 | Status: DISCONTINUED | OUTPATIENT
Start: 2019-10-24 | End: 2019-10-24

## 2019-10-24 RX ORDER — HYDROMORPHONE HYDROCHLORIDE 2 MG/ML
0.5 INJECTION INTRAMUSCULAR; INTRAVENOUS; SUBCUTANEOUS
Refills: 0 | Status: DISCONTINUED | OUTPATIENT
Start: 2019-10-24 | End: 2019-10-24

## 2019-10-24 RX ORDER — SODIUM CHLORIDE 9 MG/ML
1000 INJECTION, SOLUTION INTRAVENOUS
Refills: 0 | Status: DISCONTINUED | OUTPATIENT
Start: 2019-10-24 | End: 2019-10-24

## 2019-10-24 RX ORDER — ONDANSETRON 8 MG/1
4 TABLET, FILM COATED ORAL ONCE
Refills: 0 | Status: DISCONTINUED | OUTPATIENT
Start: 2019-10-24 | End: 2019-10-24

## 2019-10-24 RX ORDER — BENZOCAINE AND MENTHOL 5; 1 G/100ML; G/100ML
1 LIQUID ORAL EVERY 8 HOURS
Refills: 0 | Status: DISCONTINUED | OUTPATIENT
Start: 2019-10-24 | End: 2019-10-24

## 2019-10-24 RX ADMIN — BENZOCAINE AND MENTHOL 1 LOZENGE: 5; 1 LIQUID ORAL at 16:11

## 2019-10-24 NOTE — BRIEF OPERATIVE NOTE - OPERATION/FINDINGS
bilateral TVF normal in appearance without lesion or masses. Right inferior parathyroid gland intraop frozen with hyperplasia. Preop , 10min post-excision PTH 17, 20min post excision PTH 13

## 2019-10-24 NOTE — BRIEF OPERATIVE NOTE - NSICDXBRIEFPROCEDURE_GEN_ALL_CORE_FT
PROCEDURES:  Parathyroidectomy 24-Oct-2019 10:21:49  Rosaura Burciaga PROCEDURES:  Microscopic direct laryngoscopy 24-Oct-2019 16:45:01  Rosaura Burciaga  Parathyroidectomy 24-Oct-2019 10:21:49  Rosaura Burciaga

## 2019-10-24 NOTE — PACU DISCHARGE NOTE - COMMENTS
Discharge Instructions reviewed with patient and mother using teachback method. copies given. VSS. Dressing intact. Incisiom well approximated toleratin po, ambulating safely. IV removed without any signs of inflammation. transported to [facility] via wheelchair.

## 2019-10-24 NOTE — BRIEF OPERATIVE NOTE - NSICDXBRIEFPREOP_GEN_ALL_CORE_FT
PRE-OP DIAGNOSIS:  Primary hyperparathyroidism 24-Oct-2019 10:22:00  Rosaura Burciaga PRE-OP DIAGNOSIS:  History of carcinoma in situ of larynx 24-Oct-2019 16:45:28  Rosaura Burciaga  Primary hyperparathyroidism 24-Oct-2019 10:22:00  Rosaura Burciaga

## 2019-10-25 LAB — SURGICAL PATHOLOGY STUDY: SIGNIFICANT CHANGE UP

## 2019-10-28 ENCOUNTER — EMERGENCY (EMERGENCY)
Facility: HOSPITAL | Age: 39
LOS: 1 days | Discharge: HOME | End: 2019-10-28
Attending: EMERGENCY MEDICINE | Admitting: EMERGENCY MEDICINE
Payer: COMMERCIAL

## 2019-10-28 VITALS
TEMPERATURE: 98 F | SYSTOLIC BLOOD PRESSURE: 125 MMHG | WEIGHT: 160.06 LBS | DIASTOLIC BLOOD PRESSURE: 85 MMHG | OXYGEN SATURATION: 97 % | HEART RATE: 100 BPM | RESPIRATION RATE: 16 BRPM | HEIGHT: 65 IN

## 2019-10-28 VITALS
OXYGEN SATURATION: 97 % | HEART RATE: 93 BPM | SYSTOLIC BLOOD PRESSURE: 113 MMHG | RESPIRATION RATE: 17 BRPM | TEMPERATURE: 98 F | DIASTOLIC BLOOD PRESSURE: 66 MMHG

## 2019-10-28 DIAGNOSIS — Z98.890 OTHER SPECIFIED POSTPROCEDURAL STATES: Chronic | ICD-10-CM

## 2019-10-28 DIAGNOSIS — Z41.9 ENCOUNTER FOR PROCEDURE FOR PURPOSES OTHER THAN REMEDYING HEALTH STATE, UNSPECIFIED: Chronic | ICD-10-CM

## 2019-10-28 LAB
ANION GAP SERPL CALC-SCNC: 11 MMOL/L — SIGNIFICANT CHANGE UP (ref 5–17)
BASE EXCESS BLDV CALC-SCNC: 1.7 MMOL/L — SIGNIFICANT CHANGE UP
BASOPHILS # BLD AUTO: 0.06 K/UL — SIGNIFICANT CHANGE UP (ref 0–0.2)
BASOPHILS NFR BLD AUTO: 0.7 % — SIGNIFICANT CHANGE UP (ref 0–2)
BUN SERPL-MCNC: 9 MG/DL — SIGNIFICANT CHANGE UP (ref 7–23)
CA-I SERPL-SCNC: 1.19 MMOL/L — SIGNIFICANT CHANGE UP (ref 1.12–1.3)
CALCIUM SERPL-MCNC: 9.4 MG/DL — SIGNIFICANT CHANGE UP (ref 8.4–10.5)
CHLORIDE SERPL-SCNC: 101 MMOL/L — SIGNIFICANT CHANGE UP (ref 96–108)
CO2 SERPL-SCNC: 26 MMOL/L — SIGNIFICANT CHANGE UP (ref 22–31)
CREAT SERPL-MCNC: 0.69 MG/DL — SIGNIFICANT CHANGE UP (ref 0.5–1.3)
EOSINOPHIL # BLD AUTO: 0.12 K/UL — SIGNIFICANT CHANGE UP (ref 0–0.5)
EOSINOPHIL NFR BLD AUTO: 1.4 % — SIGNIFICANT CHANGE UP (ref 0–6)
GAS PNL BLDV: 137 MMOL/L — LOW (ref 138–146)
GAS PNL BLDV: SIGNIFICANT CHANGE UP
GLUCOSE SERPL-MCNC: 91 MG/DL — SIGNIFICANT CHANGE UP (ref 70–99)
HCO3 BLDV-SCNC: 26 MMOL/L — SIGNIFICANT CHANGE UP (ref 20–27)
HCT VFR BLD CALC: 45.5 % — SIGNIFICANT CHANGE UP (ref 39–50)
HGB BLD-MCNC: 14.9 G/DL — SIGNIFICANT CHANGE UP (ref 13–17)
IMM GRANULOCYTES NFR BLD AUTO: 0.7 % — SIGNIFICANT CHANGE UP (ref 0–1.5)
LYMPHOCYTES # BLD AUTO: 2.09 K/UL — SIGNIFICANT CHANGE UP (ref 1–3.3)
LYMPHOCYTES # BLD AUTO: 24.3 % — SIGNIFICANT CHANGE UP (ref 13–44)
MAGNESIUM SERPL-MCNC: 1.9 MG/DL — SIGNIFICANT CHANGE UP (ref 1.6–2.6)
MCHC RBC-ENTMCNC: 29.2 PG — SIGNIFICANT CHANGE UP (ref 27–34)
MCHC RBC-ENTMCNC: 32.7 GM/DL — SIGNIFICANT CHANGE UP (ref 32–36)
MCV RBC AUTO: 89.2 FL — SIGNIFICANT CHANGE UP (ref 80–100)
MONOCYTES # BLD AUTO: 0.55 K/UL — SIGNIFICANT CHANGE UP (ref 0–0.9)
MONOCYTES NFR BLD AUTO: 6.4 % — SIGNIFICANT CHANGE UP (ref 2–14)
NEUTROPHILS # BLD AUTO: 5.72 K/UL — SIGNIFICANT CHANGE UP (ref 1.8–7.4)
NEUTROPHILS NFR BLD AUTO: 66.5 % — SIGNIFICANT CHANGE UP (ref 43–77)
NRBC # BLD: 0 /100 WBCS — SIGNIFICANT CHANGE UP (ref 0–0)
PCO2 BLDV: 39 MMHG — LOW (ref 41–51)
PH BLDV: 7.44 — HIGH (ref 7.32–7.43)
PLATELET # BLD AUTO: 321 K/UL — SIGNIFICANT CHANGE UP (ref 150–400)
PO2 BLDV: 41 MMHG — SIGNIFICANT CHANGE UP
POTASSIUM BLDV-SCNC: 3.9 MMOL/L — SIGNIFICANT CHANGE UP (ref 3.5–4.9)
POTASSIUM SERPL-MCNC: 4 MMOL/L — SIGNIFICANT CHANGE UP (ref 3.5–5.3)
POTASSIUM SERPL-SCNC: 4 MMOL/L — SIGNIFICANT CHANGE UP (ref 3.5–5.3)
PTH-INTACT IO % DIF SERPL: 48.9 PG/ML — SIGNIFICANT CHANGE UP (ref 8.5–72.5)
RBC # BLD: 5.1 M/UL — SIGNIFICANT CHANGE UP (ref 4.2–5.8)
RBC # FLD: 12.2 % — SIGNIFICANT CHANGE UP (ref 10.3–14.5)
SAO2 % BLDV: 78 % — SIGNIFICANT CHANGE UP
SODIUM SERPL-SCNC: 138 MMOL/L — SIGNIFICANT CHANGE UP (ref 135–145)
WBC # BLD: 8.6 K/UL — SIGNIFICANT CHANGE UP (ref 3.8–10.5)
WBC # FLD AUTO: 8.6 K/UL — SIGNIFICANT CHANGE UP (ref 3.8–10.5)

## 2019-10-28 PROCEDURE — 82330 ASSAY OF CALCIUM: CPT

## 2019-10-28 PROCEDURE — 84100 ASSAY OF PHOSPHORUS: CPT

## 2019-10-28 PROCEDURE — 93005 ELECTROCARDIOGRAM TRACING: CPT

## 2019-10-28 PROCEDURE — 84132 ASSAY OF SERUM POTASSIUM: CPT

## 2019-10-28 PROCEDURE — 85025 COMPLETE CBC W/AUTO DIFF WBC: CPT

## 2019-10-28 PROCEDURE — 82040 ASSAY OF SERUM ALBUMIN: CPT

## 2019-10-28 PROCEDURE — 80048 BASIC METABOLIC PNL TOTAL CA: CPT

## 2019-10-28 PROCEDURE — 83970 ASSAY OF PARATHORMONE: CPT

## 2019-10-28 PROCEDURE — 99284 EMERGENCY DEPT VISIT MOD MDM: CPT

## 2019-10-28 PROCEDURE — 82803 BLOOD GASES ANY COMBINATION: CPT

## 2019-10-28 PROCEDURE — 96361 HYDRATE IV INFUSION ADD-ON: CPT

## 2019-10-28 PROCEDURE — 84295 ASSAY OF SERUM SODIUM: CPT

## 2019-10-28 PROCEDURE — 96360 HYDRATION IV INFUSION INIT: CPT

## 2019-10-28 PROCEDURE — 83735 ASSAY OF MAGNESIUM: CPT

## 2019-10-28 PROCEDURE — 36415 COLL VENOUS BLD VENIPUNCTURE: CPT

## 2019-10-28 PROCEDURE — 99284 EMERGENCY DEPT VISIT MOD MDM: CPT | Mod: 25

## 2019-10-28 PROCEDURE — 93010 ELECTROCARDIOGRAM REPORT: CPT

## 2019-10-28 RX ORDER — SODIUM CHLORIDE 9 MG/ML
1000 INJECTION INTRAMUSCULAR; INTRAVENOUS; SUBCUTANEOUS ONCE
Refills: 0 | Status: COMPLETED | OUTPATIENT
Start: 2019-10-28 | End: 2019-10-28

## 2019-10-28 RX ADMIN — SODIUM CHLORIDE 1000 MILLILITER(S): 9 INJECTION INTRAMUSCULAR; INTRAVENOUS; SUBCUTANEOUS at 19:35

## 2019-10-28 RX ADMIN — SODIUM CHLORIDE 1000 MILLILITER(S): 9 INJECTION INTRAMUSCULAR; INTRAVENOUS; SUBCUTANEOUS at 16:46

## 2019-10-28 NOTE — ED PROVIDER NOTE - OBJECTIVE STATEMENT
s/p parathyroidectomy 4 days ago here with intermittent dizziness/lightheadeness, feeling shaky, more on right side and in hands.  Reports speaking to his ent and being told to come to ED for further eval.  Denies headache, fever/chills, chest pain, sob.  Says he had some shakiness prior to surgery but now seems increased.  Has been able to tolerate po intake but has some discomfort with swallowing post procedure.

## 2019-10-28 NOTE — ED PROVIDER NOTE - NSFOLLOWUPINSTRUCTIONS_ED_ALL_ED_FT
Please see your primary care provider/ ENT as directed.  Call for appointment.  Return to the ER if symptoms worsen or other concerns.    Paresthesia    Paresthesia is an abnormal burning or prickling sensation. This sensation is generally felt in the hands, arms, legs, or feet. However, it may occur in any part of the body. Usually, it is not painful. The feeling may be described as:    Tingling or numbness.  Pins and needles.  Skin crawling.  Buzzing.  Limbs falling asleep.  Itching.    Most people experience temporary (transient) paresthesia at some time in their lives. Paresthesia may occur when you breathe too quickly (hyperventilation). It can also occur without any apparent cause. Commonly, paresthesia occurs when pressure is placed on a nerve. The sensation quickly goes away after the pressure is removed. For some people, however, paresthesia is a long-lasting (chronic) condition that is caused by an underlying disorder. If you continue to have paresthesia, you may need further medical evaluation.    Follow these instructions at home:  Watch your condition for any changes. Taking the following actions may help to lessen any discomfort that you are feeling:    Avoid drinking alcohol.  Try acupuncture or massage to help relieve your symptoms.  Keep all follow-up visits as directed by your health care provider. This is important.    Contact a health care provider if:  You continue to have episodes of paresthesia.  Your burning or prickling feeling gets worse when you walk.  You have pain, cramps, or dizziness.  You develop a rash.  Get help right away if:  You feel weak.  You have trouble walking or moving.  You have problems with speech, understanding, or vision.  You feel confused.  You cannot control your bladder or bowel movements.  You have numbness after an injury.  You faint.  This information is not intended to replace advice given to you by your health care provider. Make sure you discuss any questions you have with your health care provider.    Near-Syncope  Near-syncope is when you suddenly get weak or dizzy, or you feel like you might pass out (faint). During an episode of near-syncope, you may:    Feel dizzy or light-headed.  Feel sick to your stomach (nauseous).  See all white or all black.  Have cold, clammy skin.    If you passed out, get help right away.  Call your local emergency services (911 in the [place] Do not drive yourself to the hospital.    Follow these instructions at home:  Pay attention to any changes in your symptoms. Take these actions to help with your condition:    Have someone stay with you until you feel stable.  Do not drive, use machinery, or play sports until your doctor says it is okay.  Keep all follow-up visits as told by your doctor. This is important.  If you start to feel like you might pass out, lie down right away and raise (elevate) your feet above the level of your heart. Breathe deeply and steadily. Wait until all of the symptoms are gone.  Drink enough fluid to keep your pee (urine) clear or pale yellow.  If you are taking blood pressure or heart medicine, get up slowly and spend many minutes getting ready to sit and then stand. This can help with dizziness.  Take over-the-counter and prescription medicines only as told by your doctor.    Get help right away if:  You have a very bad headache.  You have unusual pain in your chest, tummy, or back.  You are bleeding from your mouth or rectum.  You have black or tarry poop (stool).  You have a very fast or uneven heartbeat (palpitations).  You pass out one time or more than once.  You have jerky movements that you cannot control (seizure).  You are confused.  You have trouble walking.  You are very weak.  You have vision problems.  These symptoms may be an emergency. Do not wait to see if the symptoms will go away. Get medical help right away. Call your local emergency services (911 in the [place] Do not drive yourself to the hospital.     This information is not intended to replace advice given to you by your health care provider. Make sure you discuss any questions you have with your health care provider.

## 2019-10-28 NOTE — ED ADULT NURSE NOTE - CHPI ED NUR SYMPTOMS NEG
no vomiting/no weakness/no tingling/no decreased eating/drinking/no fever/no pain/no nausea/no chills

## 2019-10-28 NOTE — ED ADULT NURSE REASSESSMENT NOTE - NS ED NURSE REASSESS COMMENT FT1
All tests resulted, no new symptom complaint, vital signs stable, discharged to home in stable condition.

## 2019-10-28 NOTE — ED PROVIDER NOTE - PATIENT PORTAL LINK FT
You can access the FollowMyHealth Patient Portal offered by [facility] by registering at the following website: [email or web address]. By joining [employer]’s FollowMyHealth portal, you will also be able to view your health information using other applications (apps) compatible with our system.

## 2019-10-28 NOTE — ED PROVIDER NOTE - CLINICAL SUMMARY MEDICAL DECISION MAKING FREE TEXT BOX
paresthesia, weakness, lightheadedness post recent parathyroidectomy concerning for hypocalcemia, dehydration.  vitals stable.  ecg nsr, no arrythmia.  ivf given, labs obtained, ent consulted.  calcium resulted wnl but per ent, was very high prior to surgery so may be relatively low for patient/ symptomatic at normal level.  requested pth level sent.  signed out to Dr. Nichols/ ABNER Palencia pending ENT dispo.

## 2019-10-28 NOTE — ED PROVIDER NOTE - PROGRESS NOTE DETAILS
malik: pt received at sign out from dr alicia as pending ent dispo - pt cleared to go home to f/u in office

## 2019-10-28 NOTE — ED ADULT NURSE REASSESSMENT NOTE - NS ED NURSE REASSESS COMMENT FT1
Patient a/oX 3, c/o of lightheadedness and right sided tremors/shaking, no chest pain or SOB, no neuro deficits.  NSR on EKg, vital signs stable.  Right aC PIV #20 in place, all labs sent, no complications.  NSS 1 L bolus ongoing.  results and disposition pending.

## 2019-10-28 NOTE — ED ADULT NURSE NOTE - OBJECTIVE STATEMENT
Patient states had parathyroid surgery last Thursday due to elevated Calcium levels, c/o of dizziness/lightheadedness, and shaking or right side of body, no chest pain or SOB.

## 2019-10-28 NOTE — ED ADULT TRIAGE NOTE - OTHER COMPLAINTS
patient presents sent by ENT for further eval of dizziness s/p parathyroidectomy on Thursday---no neuro deficits noted---denies fevers/chills

## 2019-10-28 NOTE — ED PROVIDER NOTE - PSH
Elective surgery  bilateral ear tubes as a child  Elective surgery  appendectomy  Elective surgery  throat surgery biopsy

## 2019-10-28 NOTE — ED ADULT NURSE NOTE - NSIMPLEMENTINTERV_GEN_ALL_ED
Implemented All Universal Safety Interventions:  [place] to call system. Call bell, personal items and telephone within reach. Instruct patient to call for assistance. Room bathroom lighting operational. Non-slip footwear when patient is off stretcher. Physically safe environment: no spills, clutter or unnecessary equipment. Stretcher in lowest position, wheels locked, appropriate side rails in place.

## 2019-10-28 NOTE — CONSULT NOTE ADULT - SUBJECTIVE AND OBJECTIVE BOX
ENT Consult Note     HPI: 39y Male s/p right inferior parathyroidectomy on 10/24 who presents to ED with 3-4 days of dizziness, fatigue, weakness, and tingling in his fingers. Patient states he started having this symptoms the day after his surgery, however the dizziness worsened today which prompted to come to the ER. Also endorses right sided hand tremors however these were present before surgery. Denies fevers, chills, nausea/vomiting, headache, vision changes, or other neurological sx. Denies numbness or tingling in his face and toes.     Allergies    No Known Allergies    Intolerances        PAST MEDICAL & SURGICAL HISTORY:  Depression  H/O gastroesophageal reflux (GERD)  Elective surgery: bilateral ear tubes as a child  Elective surgery: appendectomy  Elective surgery: throat surgery biopsy      MEDICATIONS:  Antiinfectives:       Hematologic/Anticoagulation:      Pain medications/Neuro:      IV fluids:      Endocrine Medications:       All other standing medications:       All other PRN medications:      Vital Signs Last 24 Hrs  T(C): 36.8 (28 Oct 2019 15:36), Max: 36.8 (28 Oct 2019 15:36)  T(F): 98.3 (28 Oct 2019 15:36), Max: 98.3 (28 Oct 2019 15:36)  HR: 100 (28 Oct 2019 15:36) (100 - 100)  BP: 125/85 (28 Oct 2019 15:36) (125/85 - 125/85)  BP(mean): --  RR: 16 (28 Oct 2019 15:36) (16 - 16)  SpO2: 97% (28 Oct 2019 15:36) (97% - 97%)    LABS:                        14.9   8.60  )-----------( 321      ( 28 Oct 2019 16:33 )             45.5       10-28    138  |  101  |  9   ----------------------------<  91  4.0   |  26  |  0.69    Ca    9.4      28 Oct 2019 16:33  Phos  4.2     10-28  Mg     1.9     10-28    TPro  x   /  Alb  4.7  /  TBili  x   /  DBili  x   /  AST  x   /  ALT  x   /  AlkPhos  x   10-28    Coagulation Studies-    Endocrine Panel-  --  --  9.4 mg/dL        PHYSICAL EXAM:    Constitutional: Well-developed, well-nourished.  No hoarseness.     Head:  normocephalic, atraumatic.   EOMI, no nystagmus   Face Symmetric   OC/OP:  Tonsils present/absent. Floor of mouth, buccal mucosa, lips, hard palate, soft palate, uvula, posterior pharyngeal wall normal.  Mucosa moist.  Neck:  Trachea midline.  Midline neck incision c/d/i, healing well. Soft with mild swelling, no e/o fluid collection  + Chovstek sign on right       Assessment/Plan:  39y Male s/p right inferior parathyroidectomy on 10/24, who presents with 3-4 days of dizziness, fatigue, weakness, and tingling in fingers. All labs wnl, Calcium 9.4. Patient may be adjusting to lower yet normal calcium level from his baseline preop      FINAL RECOMMENDATIONS PENDING         [facility] at [phone] with any questions/concerns. ENT Consult Note     HPI: 39y Male s/p right inferior parathyroidectomy on 10/24 who presents to ED with 3-4 days of dizziness, fatigue, weakness, and tingling in his fingers. Patient states he started having this symptoms the day after his surgery, however the dizziness worsened today which prompted to come to the ER. Also endorses right sided hand tremors however these were present before surgery. Denies fevers, chills, nausea/vomiting, headache, vision changes, or other neurological sx. Denies numbness or tingling in his face and toes.     Allergies    No Known Allergies    Intolerances        PAST MEDICAL & SURGICAL HISTORY:  Depression  H/O gastroesophageal reflux (GERD)  Elective surgery: bilateral ear tubes as a child  Elective surgery: appendectomy  Elective surgery: throat surgery biopsy      MEDICATIONS:  Antiinfectives:       Hematologic/Anticoagulation:      Pain medications/Neuro:      IV fluids:      Endocrine Medications:       All other standing medications:       All other PRN medications:      Vital Signs Last 24 Hrs  T(C): 36.8 (28 Oct 2019 15:36), Max: 36.8 (28 Oct 2019 15:36)  T(F): 98.3 (28 Oct 2019 15:36), Max: 98.3 (28 Oct 2019 15:36)  HR: 100 (28 Oct 2019 15:36) (100 - 100)  BP: 125/85 (28 Oct 2019 15:36) (125/85 - 125/85)  BP(mean): --  RR: 16 (28 Oct 2019 15:36) (16 - 16)  SpO2: 97% (28 Oct 2019 15:36) (97% - 97%)    LABS:                        14.9   8.60  )-----------( 321      ( 28 Oct 2019 16:33 )             45.5       10-28    138  |  101  |  9   ----------------------------<  91  4.0   |  26  |  0.69    Ca    9.4      28 Oct 2019 16:33  Phos  4.2     10-28  Mg     1.9     10-28    TPro  x   /  Alb  4.7  /  TBili  x   /  DBili  x   /  AST  x   /  ALT  x   /  AlkPhos  x   10-28    Coagulation Studies-    Endocrine Panel-  --  --  9.4 mg/dL        PHYSICAL EXAM:    Constitutional: Well-developed, well-nourished.  No hoarseness.     Head:  normocephalic, atraumatic.   EOMI, no nystagmus   Face Symmetric   OC/OP:  Tonsils present/absent. Floor of mouth, buccal mucosa, lips, hard palate, soft palate, uvula, posterior pharyngeal wall normal.  Mucosa moist.  Neck:  Trachea midline.  Midline neck incision c/d/i, healing well. Soft with mild swelling, no e/o fluid collection  + Chovstek sign on right       Assessment/Plan:  39y Male s/p right inferior parathyroidectomy on 10/24, who presents with 3-4 days of dizziness, fatigue, weakness, and tingling in fingers. All labs wnl, Calcium 9.4. Patient may be adjusting to lower yet normal calcium level from his baseline preop  - Draw PTH  - No acute ENT intervention at this time  - Follow up with Dr. Aparicio on Wednesday 10/30 or sooner if condition worsens     [facility] at [phone] with any questions/concerns.

## 2019-10-30 ENCOUNTER — APPOINTMENT (OUTPATIENT)
Dept: OTOLARYNGOLOGY | Facility: CLINIC | Age: 39
End: 2019-10-30
Payer: COMMERCIAL

## 2019-10-30 VITALS
DIASTOLIC BLOOD PRESSURE: 83 MMHG | HEART RATE: 88 BPM | OXYGEN SATURATION: 99 % | WEIGHT: 160 LBS | BODY MASS INDEX: 26.66 KG/M2 | SYSTOLIC BLOOD PRESSURE: 126 MMHG | HEIGHT: 65 IN

## 2019-10-30 PROCEDURE — 31575 DIAGNOSTIC LARYNGOSCOPY: CPT | Mod: 58

## 2019-10-30 PROCEDURE — 99024 POSTOP FOLLOW-UP VISIT: CPT

## 2019-10-31 NOTE — REASON FOR VISIT
[Post-Operative Visit] : a post-operative visit [FreeTextEntry2] : 1 week post right parathyroid adenoma excision

## 2019-10-31 NOTE — HISTORY OF PRESENT ILLNESS
[de-identified] : 39M with primary hyperparathyroidism s/p right inferior parathyroidectomy. Patient is presenting with his mother for 1 week post op follow up. Patient reports feeling dizzy and on/off bilateral fingers tingling. He reports right hand tremors from for long time before the surgery. Patients symptoms are improving. He presented to ER on 10/29/19 fo rthe above symptoms and labs were normal PTH 48.9, Ca 9.4.\par \par Normal voice and swallowing\par \par Pathology:\par Final Diagnosis\par \par 1. Right inferior parathyroid gland, excision:\par - Enlarged hypercellular parathyroid gland consistent with\par adenoma.\par \par 2. Possible right superior parathyroid, biopsy:\par - Benign fibroadipose tissue.  No parathyroid tissue seen.\par

## 2019-10-31 NOTE — ASSESSMENT
[FreeTextEntry1] : 39M 1 week s/p  right parathyroidectomy. Patient reports improving dizziness & finger tingling (bilateral). Normal laryngoscopy and PTH/Ca. Pathology confirmed right inferior parathyroid adenoma excision. \par - Repeat labs including B12 (vegan)\par - F/u in 2 months\par - Referred to GI for GERD\par \par

## 2019-10-31 NOTE — PROCEDURE
[Image(s) Captured] : image(s) captured and filed [Unable to Cooperate with Mirror] : patient unable to cooperate with mirror [None] : none [Flexible Endoscope] : examined with the flexible endoscope [Normal] : the false vocal folds were pink and regular, the ventricular sulcus was open, the true vocal folds were glistening white, tense and of equal length, mobility, and height [True Vocal Cords Paralysis] : no true vocal cord paralysis

## 2019-10-31 NOTE — PHYSICAL EXAM
[Midline] : trachea located in midline position [Normal] : orientation to person, place, and time: normal [de-identified] : healthy lower midline wound with intact dressing, no signs of inflammation like erythema, tenderness or warmth, no swelling

## 2019-11-01 DIAGNOSIS — R42 DIZZINESS AND GIDDINESS: ICD-10-CM

## 2019-11-01 DIAGNOSIS — R20.2 PARESTHESIA OF SKIN: ICD-10-CM

## 2019-11-01 LAB
ALBUMIN SERPL ELPH-MCNC: 4.9 G/DL
ALP BLD-CCNC: 59 U/L
ALT SERPL-CCNC: 28 U/L
ANION GAP SERPL CALC-SCNC: 11 MMOL/L
AST SERPL-CCNC: 19 U/L
BILIRUB SERPL-MCNC: 0.6 MG/DL
BUN SERPL-MCNC: 11 MG/DL
CALCIUM SERPL-MCNC: 10 MG/DL
CALCIUM SERPL-MCNC: 10 MG/DL
CHLORIDE SERPL-SCNC: 97 MMOL/L
CO2 SERPL-SCNC: 26 MMOL/L
CREAT SERPL-MCNC: 0.77 MG/DL
GLUCOSE SERPL-MCNC: 101 MG/DL
PARATHYROID HORMONE INTACT: 52 PG/ML
POTASSIUM SERPL-SCNC: 4.5 MMOL/L
PROT SERPL-MCNC: 7.8 G/DL
SODIUM SERPL-SCNC: 134 MMOL/L
VIT B12 SERPL-MCNC: 465 PG/ML

## 2019-12-05 DIAGNOSIS — J31.0 CHRONIC RHINITIS: ICD-10-CM

## 2019-12-05 RX ORDER — FLUTICASONE PROPIONATE 50 UG/1
50 SPRAY, METERED NASAL DAILY
Qty: 1 | Refills: 1 | Status: ACTIVE | COMMUNITY
Start: 2019-12-05 | End: 1900-01-01

## 2020-01-05 NOTE — ED PROVIDER NOTE - PSYCHIATRIC, MLM
Encounter Date: 1/5/2020    SCRIBE #1 NOTE: I, Lisa Santiago, am scribing for, and in the presence of,  JOSÉ ANTONIO Rivas. I have scribed the following portions of the note - Other sections scribed: HPI, ROS, PE.       History     Chief Complaint   Patient presents with    Cough     congestion for a week getting worse over the past few days, patient has a history of acid reflux.      45 y.o. male presents to the ED with cough and congestion for 1 week.  Patient denies fever, fatigue, chest pain, shortness of breath, abdominal pain, nausea, vomiting, diarrhea, dysuria, hematuria, rash, numbness, weakness, or tingling.  Patient denies pain at present.  He has tried DayQuil/NyQuil, and theraflu with no relief of symptoms.  No aggravating factors.          The history is provided by the patient. No  was used.     Review of patient's allergies indicates:  No Known Allergies  Past Medical History:   Diagnosis Date    Hypertension      Past Surgical History:   Procedure Laterality Date    APPENDECTOMY      right hand sx       History reviewed. No pertinent family history.  Social History     Tobacco Use    Smoking status: Never Smoker    Smokeless tobacco: Never Used   Substance Use Topics    Alcohol use: Not on file    Drug use: Not on file     Review of Systems   Constitutional: Negative for chills and fever.   HENT: Positive for congestion. Negative for ear pain, rhinorrhea and sore throat.    Eyes: Negative for pain, discharge and redness.   Respiratory: Positive for cough. Negative for shortness of breath.    Cardiovascular: Negative for chest pain.   Gastrointestinal: Negative for abdominal pain, diarrhea, nausea and vomiting.   Genitourinary: Negative for dysuria.   Musculoskeletal: Negative for back pain, neck pain and neck stiffness.   Skin: Negative for rash.   Neurological: Negative for dizziness, weakness, light-headedness, numbness and headaches.   Psychiatric/Behavioral:  Negative for confusion.   All other systems reviewed and are negative.      Physical Exam     Initial Vitals [01/05/20 1008]   BP Pulse Resp Temp SpO2   (!) 135/96 75 18 98.5 °F (36.9 °C) 96 %      MAP       --         Physical Exam    Nursing note and vitals reviewed.  Constitutional: Vital signs are normal. He appears well-developed and well-nourished. He is cooperative.  Non-toxic appearance. He does not appear ill.   HENT:   Head: Normocephalic and atraumatic.   Right Ear: Tympanic membrane and external ear normal.   Left Ear: Tympanic membrane and external ear normal.   Nose: Mucosal edema and rhinorrhea present.   Mouth/Throat: Uvula is midline, oropharynx is clear and moist and mucous membranes are normal.   Eyes: Conjunctivae are normal.   Neck: Normal range of motion. Neck supple.   Cardiovascular: Normal rate and regular rhythm.   Pulmonary/Chest: Effort normal and breath sounds normal. No respiratory distress.   Abdominal: Normal appearance and bowel sounds are normal. There is no tenderness. There is no rebound, no guarding and no CVA tenderness.   Neurological: He is alert and oriented to person, place, and time. GCS eye subscore is 4. GCS verbal subscore is 5. GCS motor subscore is 6.   Skin: Skin is warm, dry and intact. No rash noted.   Psychiatric: He has a normal mood and affect. His speech is normal and behavior is normal. Thought content normal.         ED Course   Procedures  Labs Reviewed - No data to display       Imaging Results           X-Ray Chest PA And Lateral (Final result)  Result time 01/05/20 11:01:25    Final result by Nirmal Lamas MD (01/05/20 11:01:25)                 Impression:      Subcentimeter nodular density projects over the left lung base.  Possibly artifact from overlying nipple shadow, but underlying pulmonary nodule not excluded.    Consider repeat radiographs with nipple markers in place and additional views or follow-up CT chest for clarification.    No focal  consolidation identified.    This report was flagged in Epic as abnormal.      Electronically signed by: Nirmal Lamas MD  Date:    01/05/2020  Time:    11:01             Narrative:    EXAMINATION:  XR CHEST PA AND LATERAL    CLINICAL HISTORY:  cough;    TECHNIQUE:  PA and lateral views of the chest were performed.    COMPARISON:  None    FINDINGS:  The cardiomediastinal silhouette is normal in size and midline. Pulmonary vascularity appears within normal limits.    8 mm nodular density projects over the left lung base on the PA view.  Lesion not confidently identified on the lateral radiograph.  Lungs otherwise clear.    Osseous structures appear intact.                                 Medical Decision Making:   History:   Old Medical Records: I decided to obtain old medical records.       APC / Resident Notes:   This is an evaluation of a 45 y.o. male that presents to the Emergency Department for URI symptoms. The patient is a non-toxic, afebrile, and well appearing male. On physical exam ears and pharynx are without evidence of infection. Appears well hydrated with moist mucus membranes. Neck soft and supple with no meningeal signs or cervical lymphadenopathy. Breath sounds are clear and equal bilaterally with no adventitious breath sounds, tachypnea or respiratory distress with room air pulse ox of 96% and no evidence of hypoxia.     Vital Signs Are Reassuring. RESULTS: Influenza negative, CXR showed no infiltrate with possible pulmonary nodule on the left on one view    My overall impression is bronchitis, pulmonary nodule. I considered, but at this time, do not suspect influenza, OM, OE, strep pharyngitis, meningitis, pneumonia, or acute bacterial sinusitis.    D/C Meds: Doxycycline, phenergan DM, zyrtec, flonase, tessalon perles. D/C Information: Tylenol/Ibuprofen PRN, Hydration. The diagnosis, treatment plan, instructions for follow-up and reevaluation with PCP for repeat Xray or CT scan for further eval of  pulmonary nodule as well as ED return precautions were discussed and understanding was verbalized. All questions or concerns have been addressed.          Scribe Attestation:   Scribe #1: I performed the above scribed service and the documentation accurately describes the services I performed. I attest to the accuracy of the note.    Physician Attestation for Scribe:  Physician Attestation Statement for Scribe: I, JOSÉ ANTONIO Rivas, reviewed documentation, as scribed by Lisa Santiago in my presence, and it is both accurate and complete.                       Clinical Impression:     1. Bronchitis    2. Pulmonary nodule, left        Disposition:   Disposition: Discharged  Condition: Stable                     JOSÉ ANTONIO Taylor  01/05/20 1151     Alert and oriented to person, place, time/situation. normal mood and affect. no apparent risk to self or others.

## 2020-01-08 ENCOUNTER — APPOINTMENT (OUTPATIENT)
Dept: OTOLARYNGOLOGY | Facility: CLINIC | Age: 40
End: 2020-01-08

## 2020-01-08 VITALS
WEIGHT: 165 LBS | HEIGHT: 65 IN | DIASTOLIC BLOOD PRESSURE: 79 MMHG | OXYGEN SATURATION: 99 % | BODY MASS INDEX: 27.49 KG/M2 | SYSTOLIC BLOOD PRESSURE: 123 MMHG | HEART RATE: 92 BPM

## 2020-01-08 DIAGNOSIS — J34.89 OTHER SPECIFIED DISORDERS OF NOSE AND NASAL SINUSES: ICD-10-CM

## 2020-01-08 PROBLEM — G90.522 COMPLEX REGIONAL PAIN SYNDROME I OF LEFT LOWER LIMB: Chronic | Status: ACTIVE | Noted: 2019-10-23

## 2020-01-08 PROBLEM — K21.9 GASTRO-ESOPHAGEAL REFLUX DISEASE WITHOUT ESOPHAGITIS: Chronic | Status: ACTIVE | Noted: 2019-10-23

## 2020-01-08 PROBLEM — C32.9 MALIGNANT NEOPLASM OF LARYNX, UNSPECIFIED: Chronic | Status: ACTIVE | Noted: 2019-10-23

## 2020-01-08 PROBLEM — F41.9 ANXIETY DISORDER, UNSPECIFIED: Chronic | Status: ACTIVE | Noted: 2019-10-23

## 2020-01-08 PROBLEM — F90.9 ATTENTION-DEFICIT HYPERACTIVITY DISORDER, UNSPECIFIED TYPE: Chronic | Status: ACTIVE | Noted: 2019-10-23

## 2020-01-08 RX ORDER — OMEPRAZOLE 40 MG/1
40 CAPSULE, DELAYED RELEASE ORAL
Qty: 90 | Refills: 0 | Status: DISCONTINUED | COMMUNITY
Start: 2017-07-26 | End: 2020-01-08

## 2020-01-08 RX ORDER — OMEPRAZOLE 40 MG/1
40 CAPSULE, DELAYED RELEASE ORAL
Refills: 0 | Status: DISCONTINUED | COMMUNITY
End: 2020-01-08

## 2020-01-08 RX ORDER — OMEPRAZOLE 20 MG/1
20 CAPSULE, DELAYED RELEASE ORAL DAILY
Qty: 30 | Refills: 3 | Status: DISCONTINUED | COMMUNITY
Start: 2017-12-27 | End: 2020-01-08

## 2020-01-08 RX ORDER — OMEPRAZOLE 40 MG/1
40 CAPSULE, DELAYED RELEASE ORAL
Qty: 90 | Refills: 0 | Status: DISCONTINUED | COMMUNITY
Start: 2018-01-09 | End: 2020-01-08

## 2020-01-08 NOTE — ASSESSMENT
[FreeTextEntry1] : 39M with T1N0M0 glottic carcinoma, LEANN on scope.  Also s/p 10/24/19 right inferior parathyroidectomy for parathyroid adenoma, and dry nose.\par \par - Apply Eucerin cream to nasal cavity several times a day x 3-4 weeks.\par - Avoid picking nose.\par - Use cool humidifier.\par - Repeat Ca/PTH\par - Call if nasal congestion does not resolve after 4 weeks of Eucerin.\par - F/u 2 months.\par - Pt verbalized understanding of above.

## 2020-01-08 NOTE — PROCEDURE
[Image(s) Captured] : image(s) captured and filed [Unable to Cooperate with Mirror] : patient unable to cooperate with mirror [None] : none [Hoarseness] : hoarseness not clearly evaluated by indirect laryngoscopy [Flexible Endoscope] : examined with the flexible endoscope [de-identified] : surveillance of glottic ca [de-identified] : Flexible fiberoptic laryngoscope advanced orally, no oropharyngeal lesions or masses identified, larynx visualized, adequate and symmetric cord adduction and abduction noted. Endoscopy was then introduced nasally bilaterally, dry nasal passage with crusting and dried blood, OMC patent, no polyps identified, no mucus secretions identified.\par

## 2020-01-08 NOTE — HISTORY OF PRESENT ILLNESS
[de-identified] : 39 year old male with history of T1N0M0 glottic carcinoma s/p microexcision by outside provider with no clinical evidence of disease, followed closely for recurrence.  He is also s/p 10/24/19 right inferior parathyroidectomy for parathyroid adenoma.  He c/o chronic nasal congestion which is not relieved by Flonase.  He has occasional throat clearing and cough, unchanged by discontinuing PPI.  He is currently going through a divorce and is prescribed gabapentin qHS by his psychiatrist for insomnia and anxiety.\par \par Denies fever, chills, change in voice, neck pain, SOB, hemoptysis, dysphagia. He reports unchanged intermittent hand tingling and tremors. Some weight gain.

## 2020-01-08 NOTE — REASON FOR VISIT
[Post-Operative Visit] : a post-operative visit [FreeTextEntry2] : surveillance glottic ca, s/p parathyroidectomy, chronic nasal congestion

## 2020-01-08 NOTE — REVIEW OF SYSTEMS
[Discolored Nasal Discharge] : discolored nasal discharge [Nasal Congestion] : nasal congestion [As Noted in HPI] : as noted in HPI [Negative] : Heme/Lymph [Anxiety] : anxiety [Sleep Disturbances] : sleep disturbances

## 2020-01-08 NOTE — PHYSICAL EXAM
[Laryngoscopy Performed] : laryngoscopy was performed, see procedure section for findings [Normal] : no neck adenopathy [de-identified] : healthy lower midline scar, flat, nontender, no erythema or edema

## 2020-03-18 ENCOUNTER — APPOINTMENT (OUTPATIENT)
Dept: OTOLARYNGOLOGY | Facility: CLINIC | Age: 40
End: 2020-03-18

## 2020-04-30 ENCOUNTER — TRANSCRIPTION ENCOUNTER (OUTPATIENT)
Age: 40
End: 2020-04-30

## 2020-05-06 ENCOUNTER — TRANSCRIPTION ENCOUNTER (OUTPATIENT)
Age: 40
End: 2020-05-06

## 2020-06-10 ENCOUNTER — APPOINTMENT (OUTPATIENT)
Dept: OTOLARYNGOLOGY | Facility: CLINIC | Age: 40
End: 2020-06-10
Payer: MEDICAID

## 2020-06-10 VITALS
SYSTOLIC BLOOD PRESSURE: 130 MMHG | BODY MASS INDEX: 27.49 KG/M2 | HEIGHT: 65 IN | DIASTOLIC BLOOD PRESSURE: 82 MMHG | HEART RATE: 94 BPM | WEIGHT: 165 LBS

## 2020-06-10 DIAGNOSIS — E21.0 PRIMARY HYPERPARATHYROIDISM: ICD-10-CM

## 2020-06-10 PROCEDURE — 92550 TYMPANOMETRY & REFLEX THRESH: CPT

## 2020-06-10 PROCEDURE — 99214 OFFICE O/P EST MOD 30 MIN: CPT | Mod: 25

## 2020-06-10 PROCEDURE — 31575 DIAGNOSTIC LARYNGOSCOPY: CPT

## 2020-06-10 PROCEDURE — 92557 COMPREHENSIVE HEARING TEST: CPT

## 2020-06-12 PROBLEM — E21.0 PRIMARY HYPERPARATHYROIDISM: Status: ACTIVE | Noted: 2019-06-13

## 2020-06-12 NOTE — ASSESSMENT
[FreeTextEntry1] : 39M with T1N0M0 glottic carcinoma, LEANN on scope. Also s/p 10/24/19 right inferior parathyroidectomy for parathyroid adenoma, and chronic nasal congestion.  New onset vertigo 6/5/20.\par \par - Audiogram referral.\par - Upload CT head/CXR CD for internal review.\par - Obtain imaging reports.\par - After audiogram and imaging review, consider referral to [facility] or neuro for further evaluation of vertigo.\par - Pt verbalized understanding of above. \par

## 2020-06-12 NOTE — HISTORY OF PRESENT ILLNESS
[de-identified] : 39 year old male with history of T1N0M0 glottic carcinoma s/p microexcision by outside provider with no clinical evidence of disease, followed closely for recurrence. He is also s/p 10/24/19 right inferior parathyroidectomy for parathyroid adenoma. He c/o chronic nasal congestion, continues with Flonase which does not provide lasting relief. He has occasional throat clearing and cough, unchanged by discontinuing PPI. \par \par Denies fever, chills, change in voice, neck pain, SOB, hemoptysis, dysphagia. He reports unchanged intermittent hand tingling and tremors. Some weight gain with gabapentin. \par \par He reports vertigo and generalized weakness, unable to open eyes, lift head or extremities on 6/5/20.  He went to [facility] ER, was given NS, meclizine and Toradol, CT head, CXR performed, CBC, CMP normal (Calcium 9.4). He was advised to follow up with ENT and neuro.  He reports weakness improved. Continues to have vertigo, tingling in the fingertips and toes, joint pain.  Reports prior history of Lyme disease, reports labs last year were negative. No prior episodes of vertigo.  Hx of tymp tubes.

## 2020-06-12 NOTE — PROCEDURE
[Unable to Cooperate with Mirror] : patient unable to cooperate with mirror [Hoarseness] : hoarseness not clearly evaluated by indirect laryngoscopy [Image(s) Captured] : image(s) captured and filed [Flexible Endoscope] : examined with the flexible endoscope [None] : none [de-identified] : Flexible fiberoptic laryngoscope advanced orally, no oropharyngeal lesions or masses identified, larynx visualized, adequate and symmetric cord adduction and abduction noted, no glottic lesions.  [de-identified] : surveillance of glottic ca

## 2020-06-12 NOTE — REASON FOR VISIT
[Subsequent Evaluation] : a subsequent evaluation for [Parent] : parent [FreeTextEntry2] : surveillance glottic ca,hoarseness, parathyroid, vertigo

## 2020-06-12 NOTE — PHYSICAL EXAM
[Laryngoscopy Performed] : laryngoscopy was performed, see procedure section for findings [Normal] : orientation to person, place, and time: normal [de-identified] : healthy lower midline scar [de-identified] : hx of tymp tubes, scarring of b/l tm, no effusions noted [de-identified] : no cerumen [de-identified] : Dizziness with [place]-Hallpike maneuver, no nystagmus.  Negative on right.

## 2020-06-26 ENCOUNTER — APPOINTMENT (OUTPATIENT)
Dept: NEUROLOGY | Facility: CLINIC | Age: 40
End: 2020-06-26
Payer: MEDICAID

## 2020-06-26 DIAGNOSIS — R42 DIZZINESS AND GIDDINESS: ICD-10-CM

## 2020-06-26 PROCEDURE — 99202 OFFICE O/P NEW SF 15 MIN: CPT | Mod: 95

## 2020-06-26 NOTE — HISTORY OF PRESENT ILLNESS
[Home] : at home, [unfilled] , at the time of the visit. [Other Location: e.g. Home (Enter Location, City,State)___] : at [unfilled] [FreeTextEntry1] : 39 year old with a history of vertigo- head and neck cancer, CRPS.\par Pt with severe knee pain - numbness and tingling from the back down the leg.\par Neck pain and tingling in the arms.\par Vertigo and dizziness - no LOC \par s/p surgery - head and neck cancer\par \par Takes Vyvanse, gabapentin, Cymbalta\par \par Awake  and alert- no aphasia\par  Full EOM\par  No nystagmus\par  No tremors or dysmetria\par  Positive Romberg\par No foot drop\par Reports - pain and swelling knee\par \par Vertigo- MRI head- Doppler\par  Possible cervical vertigo\par MRI cervical spine\par \par Paresthesias UE- LE- EMG upper and lower \par \par  [Verbal consent obtained from patient] : the patient, [unfilled]

## 2020-07-08 ENCOUNTER — APPOINTMENT (OUTPATIENT)
Dept: PHYSICAL MEDICINE AND REHAB | Facility: CLINIC | Age: 40
End: 2020-07-08
Payer: MEDICAID

## 2020-07-08 VITALS — HEIGHT: 65 IN | BODY MASS INDEX: 27.49 KG/M2 | RESPIRATION RATE: 18 BRPM | WEIGHT: 165 LBS

## 2020-07-08 DIAGNOSIS — G89.29 PAIN IN LEFT KNEE: ICD-10-CM

## 2020-07-08 DIAGNOSIS — M17.10 UNILATERAL PRIMARY OSTEOARTHRITIS, UNSPECIFIED KNEE: ICD-10-CM

## 2020-07-08 DIAGNOSIS — M25.562 PAIN IN LEFT KNEE: ICD-10-CM

## 2020-07-08 DIAGNOSIS — F41.8 OTHER SPECIFIED ANXIETY DISORDERS: ICD-10-CM

## 2020-07-08 PROCEDURE — 99204 OFFICE O/P NEW MOD 45 MIN: CPT | Mod: 95

## 2020-07-08 NOTE — HISTORY OF PRESENT ILLNESS
[Home] : at home, [unfilled] , at the time of the visit. [Medical Office: ([facility])___] : at the medical office located in  [Verbal consent obtained from patient] : the patient, [unfilled] [FreeTextEntry1] : Mr. 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 The pain is rated as 10/10 during today's visit, and ranges from 10/10. The patient's symptoms are aggravated by walking one block   and alleviated by nothing -he was on med marijuana but almost broke the bank so expensive -his wife asked him to stop  . The patient denies any/ or bowel/bladder dysfunction /saddle anesthesia . He is at a very low point in his life he admits .\par

## 2020-07-08 NOTE — CONSULT LETTER
[FreeTextEntry1] : Dear Dr. VIOLET Mckeon \par \par I had the pleasure of evaluating your patient, CHUNG VANN .\par \par Thank you very much for allowing me to participate in the care of this patient. If you have any questions, please do not hesitate to contact me. \par \par Sincerely, \par Leif Herring MD \par \par ABPMR Board Certified in Physical Medicine and Rehabilitation\par Certified Fellow of AANEM (Neuromuscular and Electrodiagnostic Medicine)\par Subspecialty certified in Sports Medicine (ABPMR)\par \par  of Physical Medicine and Rehabilitation\par [employer] [facility] [employer] [facility]\par [facility]\par \par

## 2020-07-08 NOTE — ASSESSMENT
[FreeTextEntry1] : \par PLAN AND RECOMMENDATIONS :\par \par We discussed differential diagnosis and clinical impression\par he has chronic pain syndrome with catastrophizing features high anxiety and depression \par he has had a difficult few years since the knee surgery which was a minor surgery does not explain the constellation of symptoms he has developed \par  \par Recommend\par .symptomatic care and support\par  medications as per pain \par  imaging not needed -has had plenty also not in favor of EMG -needs to learn symptom management at this stage and help with coping skills etc vocational rehab -he says he loves fish tanks and loves drawing !\par  \par  hydrotherapy /heat / cold for pain\par  continue sunlight /consider meditation through apps walks -try to push through the pain \par ice and brace left elbow \par  relative rest and avoidance of painful activity where possible \par  increasing activity as discussed +++\par  return for follow up 8 weeks \par

## 2020-07-14 ENCOUNTER — APPOINTMENT (OUTPATIENT)
Dept: NEUROLOGY | Facility: CLINIC | Age: 40
End: 2020-07-14
Payer: MEDICAID

## 2020-07-14 VITALS
BODY MASS INDEX: 29.66 KG/M2 | HEIGHT: 65 IN | OXYGEN SATURATION: 98 % | HEART RATE: 101 BPM | DIASTOLIC BLOOD PRESSURE: 83 MMHG | WEIGHT: 178 LBS | SYSTOLIC BLOOD PRESSURE: 128 MMHG | TEMPERATURE: 98.2 F

## 2020-07-14 DIAGNOSIS — M54.12 RADICULOPATHY, CERVICAL REGION: ICD-10-CM

## 2020-07-14 PROCEDURE — 99213 OFFICE O/P EST LOW 20 MIN: CPT

## 2020-07-14 RX ORDER — MECLIZINE HYDROCHLORIDE 25 MG/1
TABLET ORAL
Refills: 0 | Status: DISCONTINUED | COMMUNITY
End: 2020-07-14

## 2020-07-14 NOTE — HISTORY OF PRESENT ILLNESS
[FreeTextEntry1] : Pt accompanied by family\par  severe hypersensitivity in the Left knee but also to the LUE and LLE\par  He had rehab evaluation and he is also seeing a pain \par He is on Cymbalta \par His MRI of his head and cervical spine doesn't reveal any acute changes\par \par Awake and alert\par  Full EOM\par  Motor difficult to evaluate LUE/ LLE due to guarding and pain.\par Reports no tremors \par No foot drop\par Reflexes intact \par  No nystagmus \par Full ROM of the neck but limited Lumbar spine \par \par He was given a diagnosis  of CRPS \par  Chronic knee  pain\par  He will FU with a Rheumatologist \par \par Regarding his dizziness - he will have an VNG

## 2020-07-16 ENCOUNTER — APPOINTMENT (OUTPATIENT)
Dept: NEUROLOGY | Facility: CLINIC | Age: 40
End: 2020-07-16
Payer: MEDICAID

## 2020-07-16 PROCEDURE — 93880 EXTRACRANIAL BILAT STUDY: CPT

## 2020-07-20 ENCOUNTER — APPOINTMENT (OUTPATIENT)
Dept: PHYSICAL MEDICINE AND REHAB | Facility: CLINIC | Age: 40
End: 2020-07-20
Payer: MEDICAID

## 2020-07-20 VITALS
HEIGHT: 65 IN | BODY MASS INDEX: 29.66 KG/M2 | SYSTOLIC BLOOD PRESSURE: 122 MMHG | TEMPERATURE: 98.2 F | OXYGEN SATURATION: 99 % | WEIGHT: 178 LBS | DIASTOLIC BLOOD PRESSURE: 91 MMHG | RESPIRATION RATE: 18 BRPM | HEART RATE: 98 BPM

## 2020-07-20 DIAGNOSIS — F43.10 POST-TRAUMATIC STRESS DISORDER, UNSPECIFIED: ICD-10-CM

## 2020-07-20 DIAGNOSIS — M54.16 RADICULOPATHY, LUMBAR REGION: ICD-10-CM

## 2020-07-20 DIAGNOSIS — M22.2X2 PATELLOFEMORAL DISORDERS, LEFT KNEE: ICD-10-CM

## 2020-07-20 DIAGNOSIS — R52 PAIN, UNSPECIFIED: ICD-10-CM

## 2020-07-20 DIAGNOSIS — R53.81 OTHER MALAISE: ICD-10-CM

## 2020-07-20 DIAGNOSIS — R45.89 OTHER SYMPTOMS AND SIGNS INVOLVING EMOTIONAL STATE: ICD-10-CM

## 2020-07-20 DIAGNOSIS — R42 DIZZINESS AND GIDDINESS: ICD-10-CM

## 2020-07-20 PROCEDURE — 99215 OFFICE O/P EST HI 40 MIN: CPT

## 2020-07-20 NOTE — ASSESSMENT
[FreeTextEntry1] : We discussed differential diagnosis and clinical impression of his pain.\par He has chronic pain syndrome with catastrophizing features high anxiety and depression, with possible underlying chronic Lyme disease flare up.Post traumatic stress sonce cancer -only 3 years since so far no relapse\par he is sleeping poorly and is coping terribly \par \par Recommend:\par symptomatic care and support - Pt due to additional Lyme testing, pt to see Dr. Stockton of rheumatology\par medications as per pain - Recommend returning to see Dr. Mary of [facility]. consider symp /ganglion  blocks left arm  Recommend [facility].\par  imaging not needed \par  hydrotherapy /heat / cold for pain\par  continue sunlight /consider meditation through apps walks\par we discussed stress reduction structure of day and giving back in some way -purpose even if a little group support for cancer survivors \par \par  relative rest and avoidance of painful activity where possible, but increase activity and improve nighttime sleep and sleep hygiene.\par  increasing activity as discussed - [facility] referral for home PT\par  return for follow up 3 months\par The patient was given handouts to read on this condition,helpful hints ,exercises etc \par all questions answered\par I certify that > 50 % of time spent was spent on counselling and coordination of care and more than 50% face to face directly \par Time spent including review of documents /records/decision making /discussion  amounted  > 45 minutes\par

## 2020-07-20 NOTE — PHYSICAL EXAM
[FreeTextEntry1] : Constitutional: Alert and oriented, in mild distress secondary to pain, anxious. \par Eyes: The sclera and conjunctiva were normal, pupils were equal in size, round, and reactive to light and extraocular movements were intact. \par Pulmonary: no wheeze, breathing comfortably  \par Musculoskeletal: left extremities tender to any palpation or movement. Left upper extremity ROM limited by shoulder and elbow pain\par Neurological:. vibration sensation intact, temperature sensation intact \par Psychiatric: anxious tearful regressed \par limping\par  magnification of symptoms \par barely able to take few steps with cane \par tender all over -fibromyalgia ++ \par tight \par poor core obese abdo \par left shoulder tender painful hard to raise \par numb hand \par \par

## 2020-07-20 NOTE — REVIEW OF SYSTEMS
[Patient Intake Form Reviewed] : Patient intake form was reviewed [Joint Pain] : joint pain [Muscle Pain] : muscle pain [Dizziness] : dizziness [Difficulty Walking] : difficulty walking [Insomnia] : insomnia [Anxiety] : anxiety [Depression] : depression [Fatigue] : fatigue [Vision Problems] : vision problems [Joint Stiffness] : joint stiffness [Muscle Weakness] : muscle weakness [Negative] : Psychiatric [Fever] : no fever [Chills] : no chills [Recent Change In Weight] : ~T no recent weight change [Shortness Of Breath] : no shortness of breath [Skin Rash] : no skin rash [Cough] : no cough [Wheezing] : no wheezing [FreeTextEntry3] : +cornea transplant left [de-identified] : +numbness/tingling at left upper and lower extremities

## 2020-07-20 NOTE — REVIEW OF SYSTEMS
[Patient Intake Form Reviewed] : Patient intake form was reviewed [Joint Pain] : joint pain [Muscle Pain] : muscle pain [Dizziness] : dizziness [Difficulty Walking] : difficulty walking [Insomnia] : insomnia [Anxiety] : anxiety [Depression] : depression [Fatigue] : fatigue [Vision Problems] : vision problems [Joint Stiffness] : joint stiffness [Muscle Weakness] : muscle weakness [Negative] : Psychiatric [Fever] : no fever [Chills] : no chills [Shortness Of Breath] : no shortness of breath [Recent Change In Weight] : ~T no recent weight change [Wheezing] : no wheezing [Cough] : no cough [Skin Rash] : no skin rash [de-identified] : +numbness/tingling at left upper and lower extremities [FreeTextEntry3] : +cornea transplant left

## 2020-07-20 NOTE — ASSESSMENT
[FreeTextEntry1] : We discussed differential diagnosis and clinical impression of his pain.\par He has chronic pain syndrome with catastrophizing features high anxiety and depression, with possible underlying chronic Lyme disease flare up.Post traumatic stress sonce cancer -only 3 years since so far no relapse\par he is sleeping poorly and is coping terribly \par \par Recommend:\par symptomatic care and support - Pt due to additional Lyme testing, pt to see Dr. Stockton of rheumatology\par medications as per pain - Recommend returning to see Dr. Mary of [facility]. consider symp /ganglion  blocks left arm  Recommend [facility].\par  imaging not needed \par  hydrotherapy /heat / cold for pain\par  continue sunlight /consider meditation through apps walks\par we discussed stress reduction structure of day and giving back in some way -purpose even if a little group support for cancer survivors \par \par  relative rest and avoidance of painful activity where possible, but increase activity and improve nighttime sleep and sleep hygiene.\par  increasing activity as discussed - [facility] referral for home PT\par  return for follow up 3 months\par The patient was given handouts to read on this condition,helpful hints ,exercises etc \par all questions answered\par I certify that > 50 % of time spent was spent on counselling and coordination of care and more than 50% face to face directly \par Time spent including review of documents /records/decision making /discussion  amounted  > 45 minutes\par

## 2020-07-20 NOTE — HISTORY OF PRESENT ILLNESS
[FreeTextEntry1] : Mr. CHUNG VANN is a very pleasant 39 year male who seen for evaluation of multiple issues including PMH throat cancer with sx 2016 /left chronic knee pain after plica surgery 4 years ago with CRPS since as well as dizziness/eye problems had corneal transplant /anxiety / poor sleep / insomnia /numbness in fingers and toes chronic left sided arm and leg pain. Pt has had worsening of his pain and is having difficulty walking and standing up form a seated position. He has seen Dr. Mckeon recently who gave him a referral to Dr Stockton of rheumatology.\par \par Of note, pt had a recent positive Lyme screen; he has had chronic Lyme since 2005 with one flare up. He is due for follow up bloodwork tomorrow. Pt has had difficulty sleeping, and is unemployed and does not have much activity during the day. Pt has been seeing a psychiatrist

## 2020-09-02 ENCOUNTER — APPOINTMENT (OUTPATIENT)
Dept: OTOLARYNGOLOGY | Facility: CLINIC | Age: 40
End: 2020-09-02
Payer: MEDICAID

## 2020-09-02 VITALS
BODY MASS INDEX: 27.64 KG/M2 | SYSTOLIC BLOOD PRESSURE: 127 MMHG | TEMPERATURE: 98.3 F | OXYGEN SATURATION: 98 % | WEIGHT: 172 LBS | DIASTOLIC BLOOD PRESSURE: 70 MMHG | HEIGHT: 66 IN | HEART RATE: 93 BPM

## 2020-09-02 PROCEDURE — 99024 POSTOP FOLLOW-UP VISIT: CPT

## 2020-09-02 PROCEDURE — 31575 DIAGNOSTIC LARYNGOSCOPY: CPT | Mod: 58

## 2020-09-02 NOTE — REASON FOR VISIT
[Subsequent Evaluation] : a subsequent evaluation for [Parent] : parent [FreeTextEntry2] : surveillance glottic ca,hoarseness, parathyroid, dizziness

## 2020-09-02 NOTE — HISTORY OF PRESENT ILLNESS
[de-identified] : 39 year old male with history of T1N0M0 glottic carcinoma s/p microexcision by outside provider with no clinical evidence of disease, followed closely for recurrence. He is also s/p 10/24/19 right inferior parathyroidectomy for parathyroid adenoma. He c/o chronic nasal congestion, continues with Flonase which does not provide lasting relief. He has occasional throat clearing and cough, unchanged by discontinuing PPI. \par \par Denies fever, chills, change in voice, neck pain, SOB, hemoptysis, dysphagia. He reports unchanged intermittent hand tingling and tremors. Some weight gain with gabapentin. \par \par He reports dizziness and generalized weakness, unable to open eyes, lift head or extremities on 6/5/20.  He went to [facility], was given NS, meclizine and Toradol, CT head, CXR performed, CBC, CMP normal (Calcium 9.4). He was advised to follow up with ENT and neuro.  He reports weakness improved. Continues to have vertigo, tingling in the fingertips and toes, joint pain.  Reports prior history of Lyme disease, reports labs last year were negative. No prior episodes of vertigo.  Hx of tymp tubes.

## 2020-09-02 NOTE — PHYSICAL EXAM
[Laryngoscopy Performed] : laryngoscopy was performed, see procedure section for findings [Normal] : orientation to person, place, and time: normal [de-identified] : healthy lower midline scar [de-identified] : no cerumen [de-identified] : hx of tymp tubes, scarring of b/l tm, no effusions noted [de-identified] : Dizziness with Juntura-Hallpike maneuver, no nystagmus.  Negative on right.

## 2020-09-02 NOTE — ASSESSMENT
[FreeTextEntry1] : 39M with T1N0M0 glottic carcinoma, LEANN on scope. Also s/p 10/24/19 right inferior parathyroidectomy for parathyroid adenoma, and chronic nasal congestion.  New onset vertigo 6/5/20. Reports he is in the middle of dizziness evaluation workup- ermias. related to Lyme disease . \par \par - F/u in 2 months.\par - Continue ENT + Neuro evaluation for dizziness/otologic symptoms.\par - Pt verbalized understanding of above. \par

## 2020-09-09 ENCOUNTER — APPOINTMENT (OUTPATIENT)
Dept: PHYSICAL MEDICINE AND REHAB | Facility: CLINIC | Age: 40
End: 2020-09-09
Payer: MEDICAID

## 2020-09-09 VITALS — WEIGHT: 170 LBS | RESPIRATION RATE: 17 BRPM | BODY MASS INDEX: 27.32 KG/M2 | HEIGHT: 66 IN

## 2020-09-09 DIAGNOSIS — M54.41 LUMBAGO WITH SCIATICA, LEFT SIDE: ICD-10-CM

## 2020-09-09 DIAGNOSIS — Z86.19 PERSONAL HISTORY OF OTHER INFECTIOUS AND PARASITIC DISEASES: ICD-10-CM

## 2020-09-09 DIAGNOSIS — M54.42 LUMBAGO WITH SCIATICA, LEFT SIDE: ICD-10-CM

## 2020-09-09 DIAGNOSIS — Z91.81 HISTORY OF FALLING: ICD-10-CM

## 2020-09-09 DIAGNOSIS — M89.09 ALGONEURODYSTROPHY, MULTIPLE SITES: ICD-10-CM

## 2020-09-09 DIAGNOSIS — F41.9 ANXIETY DISORDER, UNSPECIFIED: ICD-10-CM

## 2020-09-09 DIAGNOSIS — R26.89 OTHER ABNORMALITIES OF GAIT AND MOBILITY: ICD-10-CM

## 2020-09-09 DIAGNOSIS — G89.29 LUMBAGO WITH SCIATICA, LEFT SIDE: ICD-10-CM

## 2020-09-09 DIAGNOSIS — G89.4 CHRONIC PAIN SYNDROME: ICD-10-CM

## 2020-09-09 DIAGNOSIS — F32.9 ANXIETY DISORDER, UNSPECIFIED: ICD-10-CM

## 2020-09-09 DIAGNOSIS — R29.898 OTHER SYMPTOMS AND SIGNS INVOLVING THE MUSCULOSKELETAL SYSTEM: ICD-10-CM

## 2020-09-09 PROCEDURE — 99215 OFFICE O/P EST HI 40 MIN: CPT | Mod: 95

## 2020-09-09 NOTE — PHYSICAL EXAM
[Normal] : Oriented to person, place, and time, insight and judgement were intact and the affect was normal [FreeTextEntry1] : \par \par  [de-identified] : better affect still sad looking  [de-identified] : RR 16  [de-identified] : stiff lower back froen left shoulder no ROM > 90  [de-identified] : sad facies less anxious today feels more hope [de-identified] : weak deconditioned uses cane

## 2020-09-09 NOTE — ASSESSMENT
[FreeTextEntry1] : \par PLAN AND RECOMMENDATIONS :\par \par We discussed  clinical impression\par PT has been effective -pity only 6 sessions covered with INS \par but can refer him to outpatient PT \par \par Recommend\par .symptomatic care and support\par  medications as per pain md \par  imaging having MRI left shoulder ans left elbow tomorrow left elbow also frozen cannot extend fully \par Dr Simeon will see him after the imaging \par  referral to PT near home \par  hydrotherapy /heat / cold for pain\par  continue  spinal precautions including care with bending, lifting, twisting and  carrying.\par \par  relative rest and avoidance of painful activity where possible \par  increasing activity as discussed- needs to eat more nutrition discussed \par  return for follow up 8 weeks \par

## 2020-09-09 NOTE — REVIEW OF SYSTEMS
[Patient Intake Form Reviewed] : Patient intake form was reviewed [Fatigue] : fatigue [Recent Change In Weight] : ~T recent weight change [Joint Pain] : joint pain [Muscle Pain] : muscle pain [Joint Stiffness] : joint stiffness [Headache] : headache [Muscle Weakness] : muscle weakness [Dizziness] : dizziness [Difficulty Walking] : difficulty walking [Anxiety] : anxiety [Depression] : depression [Negative] : Heme/Lymph [Chills] : no chills [Fever] : no fever [Leg Claudication] : no intermittent leg claudication [Lower Ext Edema] : no lower extremity edema

## 2020-09-09 NOTE — HISTORY OF PRESENT ILLNESS
[Home] : at home, [unfilled] , at the time of the visit. [Medical Office: ([facility])___] : at the medical office located in  [Verbal consent obtained from patient] : the patient, [unfilled] [FreeTextEntry1] : Mr. CHUNG VANN is a very pleasant saddened  39 year male returns   for update of multiple issues complicated by PMH throat cancer with Sx 2016/left chronic knee pain after plica surgery 4 years ago with CRPS- since as well as dizziness /eye problems had corneal transplant /anxiety / poor sleep / insomnia /numbness in fingers and toes-  chronic neck and lower back pains with inability to walk or exercise /chronic pain syndrome /deconditioning /shoulder hand syndrome left side as well as left frozen shoulder \par when seen last i organized home PT for him thru [facility] he has had 6 sessions and is doing HEP \par he is now going for short walks and looks less despondent today \par a well as multiple co morbidities he had LYME dz deemed chronic \par \par He saw a neurologist at [facility] left shoulder organized -being done tomorrow \par \par \par  \par   Pt states that over the past month, the pain has worsened, especially in his left arm and leg. Pt is having difficulty with ambulation and with standing from a seated position. Pt rates the pain as a 10/10 and it is constant and he does not receive much relief. The pain is present in his entire left arm and leg and any movement sets off the pain. Pt has seen psychiatry for his PTSD/anxiety/depression. Pt has had difficulty sleeping. He is unemployed and has difficulty finding a daily routine.\par Of note, pt had a recent positive Lyme test, and has had chronic Lyme since 2005, with one flare up requiring hospitalization. He is due to additional testing in the upcoming days. Pt was seen by Dr. Mckeon and recommended to see Dr Stockton of rheumatology for relapse of acute recurring Lyme . \par Pt has had prior lumbar injections including sympathetic blocks for CRP leg with Dr. James Simeon of [facility] with minimal relief.He declined RF abaltion at the time \par He comes in with his mother He is distraught and very anxious -using cane very hard to walk \par pain everywhere ...he lives with his mother -he is divorsed -his chronic illness starting with the cancer impacted on his marriage \par He has post traumatic stress \par He became overly dependent on medical marijuana and was "out of it " which was bad for his family relationship and "the kids to see" according to his mother \par He is inactive cries easily and is overwhelmed by his pain

## 2020-10-27 ENCOUNTER — APPOINTMENT (OUTPATIENT)
Dept: ORTHOPEDIC SURGERY | Facility: CLINIC | Age: 40
End: 2020-10-27
Payer: MEDICAID

## 2020-10-27 DIAGNOSIS — M89.9 DISORDER OF BONE, UNSPECIFIED: ICD-10-CM

## 2020-10-27 PROCEDURE — 99072 ADDL SUPL MATRL&STAF TM PHE: CPT

## 2020-10-27 PROCEDURE — 99203 OFFICE O/P NEW LOW 30 MIN: CPT

## 2020-10-27 NOTE — HISTORY OF PRESENT ILLNESS
[FreeTextEntry1] : This is the first visit of a 39 years old male recently sustained a fall with x-ray of the chest were taken and demonstrated an incidental finding of a possible lucent area over the second rib right upper chest wall without any history of underlying process.  In a patient who previously had a partial resection of the parathyroid gland.  Previously patient had arthroscopy of the left knee complicated by reflex sympathetic dystrophy.

## 2020-10-27 NOTE — PHYSICAL EXAM
[FreeTextEntry1] : Physical exam revealed a healthy looking patient in no apparent distress patient appears to be fully alert oriented having no complaints no any symptoms examination of the chest wall show no any significant finding.  And new plain x-ray of the chest wall show no any significant finding at this point patient with an incidental appearing of a possible lucent lesion involving the second upper rib without any localized tenderness at this point patient was recommended to be followed conservatively and to be seen again in 6 months for follow-up

## 2020-11-04 ENCOUNTER — APPOINTMENT (OUTPATIENT)
Dept: OTOLARYNGOLOGY | Facility: CLINIC | Age: 40
End: 2020-11-04

## 2020-11-11 ENCOUNTER — APPOINTMENT (OUTPATIENT)
Dept: OTOLARYNGOLOGY | Facility: CLINIC | Age: 40
End: 2020-11-11
Payer: MEDICAID

## 2020-11-11 VITALS
WEIGHT: 170 LBS | DIASTOLIC BLOOD PRESSURE: 82 MMHG | OXYGEN SATURATION: 98 % | BODY MASS INDEX: 27.32 KG/M2 | SYSTOLIC BLOOD PRESSURE: 144 MMHG | HEART RATE: 100 BPM | HEIGHT: 66 IN | TEMPERATURE: 98.1 F

## 2020-11-11 PROCEDURE — 99213 OFFICE O/P EST LOW 20 MIN: CPT | Mod: 25

## 2020-11-11 PROCEDURE — 99072 ADDL SUPL MATRL&STAF TM PHE: CPT

## 2020-11-11 PROCEDURE — 31575 DIAGNOSTIC LARYNGOSCOPY: CPT

## 2020-11-11 RX ORDER — LISDEXAMFETAMINE DIMESYLATE 50 MG/1
50 CAPSULE ORAL
Refills: 0 | Status: ACTIVE | COMMUNITY
Start: 2020-11-11

## 2020-11-11 RX ORDER — DULOXETINE HYDROCHLORIDE 30 MG/1
30 CAPSULE, DELAYED RELEASE ORAL
Refills: 0 | Status: ACTIVE | COMMUNITY
Start: 2020-11-11

## 2020-11-12 RX ORDER — CLONAZEPAM 0.5 MG/1
0.5 TABLET ORAL
Refills: 0 | Status: ACTIVE | COMMUNITY
Start: 2020-11-11

## 2020-11-12 RX ORDER — FLUOXETINE HYDROCHLORIDE 20 MG/1
20 CAPSULE ORAL
Refills: 0 | Status: DISCONTINUED | COMMUNITY
End: 2020-11-12

## 2020-11-17 NOTE — PHYSICAL EXAM
[Laryngoscopy Performed] : laryngoscopy was performed, see procedure section for findings [Normal] : orientation to person, place, and time: normal [de-identified] : healthy lower midline scar

## 2020-11-17 NOTE — PROCEDURE
[Image(s) Captured] : image(s) captured and filed [Unable to Cooperate with Mirror] : patient unable to cooperate with mirror [None] : none [Flexible Endoscope] : examined with the flexible endoscope [de-identified] : Flexible fiberoptic laryngoscope advanced orally, no oropharyngeal lesions or masses identified, larynx visualized, adequate and symmetric cord adduction and abduction noted, no glottic masses/lesions/leukoplakia/edema noted, no signs of LPR noted.\par  [de-identified] : surveillance of glottic carcinoma

## 2020-11-17 NOTE — HISTORY OF PRESENT ILLNESS
[de-identified] : 40 year old male with history of T1N0M0 glottic carcinoma s/p microexcision by outside provider with no clinical evidence of disease, followed closely for recurrence. He is also s/p 10/24/19 right inferior parathyroidectomy for parathyroid adenoma. He c/o chronic nasal congestion/PND, continues with Flonase almost daily which does not provide lasting relief. He has dry mouth and increased throat clearing.  He has hx dust and mold allergy. Has not taken any antihistamines. PPI previously discontinued, not effective.\par \par Denies fever, chills, change in voice, neck pain, SOB, hemoptysis, dysphagia. \par \par He reported dizziness and generalized weakness, unable to open eyes, lift head or extremities on 6/5/20. He went to [facility], was given NS, meclizine and Toradol, CT head, CXR performed, CBC, CMP normal (Calcium 9.4). He was advised to follow up with ENT and neuro. He reports weakness improved. Reports prior history of Lyme disease, reports labs last year were negative. No prior episodes of dizziness. Hx of tymp tubes. He continues work-up with neuro Ken[facility].\par \par He continues to follow with psych/therapy, PMR. He is seeing ortho for an incidental 2nd rib lucency noted on CXR, for observation. Has torn shoulder/elbow tendons, looking for in-network PT.  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

## 2020-11-17 NOTE — ASSESSMENT
[FreeTextEntry1] : 40M with T1N0M0 glottic carcinoma, LEANN on scope. Also s/p 10/24/19 right inferior parathyroidectomy for parathyroid adenoma, and chronic nasal congestion/throat clearing.  \par \par - Seen with SLP.\par - Continue Flonase\par - Encouraged to increase PO hydration.\par - Refer to [facility] for further evaluation of chronic nasal congestion/throat clearing.\par - F/u in 2 months.\par - Pt verbalized understanding of above. \par  \par

## 2020-11-17 NOTE — ADDENDUM
[FreeTextEntry1] : Speech Pathology:\par \par Pt seen today in conjunction with Dr. Aparicio in clinic.\par \par Pt with T1N0M0 glottic carcinoma s/p microexcision by outside MD, with LEANN on exam.   Also s/p 10/24/19 right inferior parathyroidectomy for parathyroid adenoma, complaining of persistent nasal congestion/ blowing nose and increased throat clearing x months.  Symptoms initially thought to be related to LPR/GERD, but symptoms persisted even with PPI.  On today,s visit, pt with mild to moderate dysphonia with breathy and rough vocal quality but patient noted that this has been his new baseline and he did not express any voice issues at this time.  Patient was referred to see Dr. Rodriguez for rhinological consultation for persistent nasal congestion and throat clearing.\par \par Javier Castellon MS, CCC-SLP, BCS-S\par Supervisor, Speech Pathology

## 2020-11-18 ENCOUNTER — APPOINTMENT (OUTPATIENT)
Dept: OTOLARYNGOLOGY | Facility: CLINIC | Age: 40
End: 2020-11-18

## 2021-01-13 ENCOUNTER — APPOINTMENT (OUTPATIENT)
Dept: OTOLARYNGOLOGY | Facility: CLINIC | Age: 41
End: 2021-01-13
Payer: MEDICAID

## 2021-01-13 VITALS
SYSTOLIC BLOOD PRESSURE: 112 MMHG | DIASTOLIC BLOOD PRESSURE: 73 MMHG | BODY MASS INDEX: 27.32 KG/M2 | OXYGEN SATURATION: 98 % | TEMPERATURE: 98.3 F | WEIGHT: 170 LBS | HEIGHT: 66 IN | HEART RATE: 97 BPM

## 2021-01-13 DIAGNOSIS — C32.9 MALIGNANT NEOPLASM OF LARYNX, UNSPECIFIED: ICD-10-CM

## 2021-01-13 DIAGNOSIS — R09.81 NASAL CONGESTION: ICD-10-CM

## 2021-01-13 PROCEDURE — 99072 ADDL SUPL MATRL&STAF TM PHE: CPT

## 2021-01-13 PROCEDURE — 99213 OFFICE O/P EST LOW 20 MIN: CPT | Mod: 25

## 2021-01-13 PROCEDURE — 31575 DIAGNOSTIC LARYNGOSCOPY: CPT

## 2021-01-13 RX ORDER — FLUTICASONE PROPIONATE 50 UG/1
50 SPRAY, METERED NASAL DAILY
Qty: 2 | Refills: 2 | Status: ACTIVE | COMMUNITY
Start: 2020-03-16 | End: 1900-01-01

## 2021-01-14 PROBLEM — C32.9 LARYNX CANCER: Status: ACTIVE | Noted: 2017-01-09

## 2021-01-14 PROBLEM — R09.81 NASAL CONGESTION: Status: ACTIVE | Noted: 2020-01-08

## 2021-01-14 NOTE — ASSESSMENT
[FreeTextEntry1] : 40M with T1N0M0 glottic carcinoma, LEANN on scope. Also s/p 10/24/19 right inferior parathyroidectomy for parathyroid adenoma, and chronic nasal congestion/throat clearing. \par \par - Rx. Flonase\par - Previously referred to [facility] for further evaluation of chronic nasal congestion/throat clearing. Pt to make appt when able.\par - F/u in 2 months.\par - Pt verbalized understanding of above. \par

## 2021-01-14 NOTE — PHYSICAL EXAM
[Laryngoscopy Performed] : laryngoscopy was performed, see procedure section for findings [Normal] : orientation to person, place, and time: normal [de-identified] : healthy lower midline scar

## 2021-01-14 NOTE — PROCEDURE
[Image(s) Captured] : image(s) captured and filed [Unable to Cooperate with Mirror] : patient unable to cooperate with mirror [None] : none [Flexible Endoscope] : examined with the flexible endoscope [de-identified] : Flexible fiberoptic laryngoscope advanced orally, no oropharyngeal lesions or masses identified, larynx visualized, adequate and symmetric cord adduction and abduction noted, no glottic masses/lesions/leukoplakia/edema noted. [de-identified] : surveillance of glottic cancer

## 2021-01-14 NOTE — HISTORY OF PRESENT ILLNESS
[de-identified] : 40 year old male with history of T1N0M0 glottic carcinoma s/p microexcision by outside provider with no clinical evidence of disease, followed closely for recurrence. He is also s/p 10/24/19 right inferior parathyroidectomy for parathyroid adenoma. He c/o chronic nasal congestion/PND, continues with Flonase almost daily which does not provide lasting relief. He has dry mouth and increased throat clearing. He has hx dust and mold allergy. Has not taken any antihistamines. PPI previously discontinued, not effective.\par \par Denies fever, chills, change in voice, neck pain, SOB, hemoptysis, dysphagia. \par \par He reported dizziness and generalized weakness, unable to open eyes, lift head or extremities on 6/5/20. He went to [facility], was given NS, meclizine and Toradol, CT head, CXR performed, CBC, CMP normal (Calcium 9.4). He was advised to follow up with ENT and neuro. He reports weakness improved. Reports prior history of Lyme disease, reports labs last year were negative. No prior episodes of dizziness. Hx of tymp tubes. He continues work-up with neuro Kenisha.\par \par He continues to follow with psych/therapy, PMR. He saw ortho for an incidental 2nd rib lucency noted on CXR, for observation. Has torn shoulder/elbow tendons. 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

## 2021-03-17 ENCOUNTER — APPOINTMENT (OUTPATIENT)
Dept: OTOLARYNGOLOGY | Facility: CLINIC | Age: 41
End: 2021-03-17

## 2022-11-21 NOTE — PROCEDURE
Image(s) Captured] : image(s) captured and filed [Unable to Cooperate with Mirror] : patient unable to cooperate with mirror [None] : none [Hoarseness] : hoarseness not clearly evaluated by indirect laryngoscopy [Flexible Endoscope] : examined with the flexible endoscope [de-identified] : Flexible fiberoptic laryngoscope advanced orally, no oropharyngeal lesions or masses identified, larynx visualized, adequate and symmetric cord adduction and abduction noted, no glottic lesions.  [de-identified] : surveillance of glottic ca
